# Patient Record
Sex: MALE | Race: WHITE | Employment: FULL TIME | URBAN - METROPOLITAN AREA
[De-identification: names, ages, dates, MRNs, and addresses within clinical notes are randomized per-mention and may not be internally consistent; named-entity substitution may affect disease eponyms.]

---

## 2017-01-16 ENCOUNTER — OFFICE VISIT (OUTPATIENT)
Dept: CARDIOLOGY CLINIC | Age: 67
End: 2017-01-16

## 2017-01-16 ENCOUNTER — DOCUMENTATION ONLY (OUTPATIENT)
Dept: CARDIOLOGY CLINIC | Age: 67
End: 2017-01-16

## 2017-01-16 VITALS
WEIGHT: 224 LBS | RESPIRATION RATE: 20 BRPM | OXYGEN SATURATION: 97 % | HEART RATE: 78 BPM | DIASTOLIC BLOOD PRESSURE: 86 MMHG | SYSTOLIC BLOOD PRESSURE: 148 MMHG | HEIGHT: 73 IN | BODY MASS INDEX: 29.69 KG/M2

## 2017-01-16 DIAGNOSIS — I95.1 ORTHOSTATIC HYPOTENSION: ICD-10-CM

## 2017-01-16 DIAGNOSIS — G47.33 OSA (OBSTRUCTIVE SLEEP APNEA): ICD-10-CM

## 2017-01-16 DIAGNOSIS — I10 ESSENTIAL HYPERTENSION, BENIGN: ICD-10-CM

## 2017-01-16 DIAGNOSIS — I10 ESSENTIAL HYPERTENSION, BENIGN: Primary | ICD-10-CM

## 2017-01-16 DIAGNOSIS — E78.5 DYSLIPIDEMIA: ICD-10-CM

## 2017-01-16 DIAGNOSIS — I87.2 VENOUS INSUFFICIENCY: ICD-10-CM

## 2017-01-16 RX ORDER — AMLODIPINE BESYLATE 5 MG/1
TABLET ORAL
Qty: 90 TAB | Refills: 3 | Status: SHIPPED | OUTPATIENT
Start: 2017-01-16 | End: 2017-12-25 | Stop reason: SDUPTHER

## 2017-01-16 RX ORDER — ATORVASTATIN CALCIUM 20 MG/1
20 TABLET, FILM COATED ORAL DAILY
Qty: 90 TAB | Refills: 3 | Status: SHIPPED | OUTPATIENT
Start: 2017-01-16 | End: 2018-03-22 | Stop reason: SDUPTHER

## 2017-01-16 RX ORDER — LABETALOL 100 MG/1
300 TABLET, FILM COATED ORAL 2 TIMES DAILY
Qty: 180 TAB | Refills: 3 | Status: SHIPPED | OUTPATIENT
Start: 2017-01-16 | End: 2017-08-04 | Stop reason: DRUGHIGH

## 2017-01-16 NOTE — PROGRESS NOTES
Visit Vitals    /86 (BP 1 Location: Right arm, BP Patient Position: Sitting)    Pulse 78    Resp 20    Ht 6' 1\" (1.854 m)    Wt 224 lb (101.6 kg)    SpO2 97%    BMI 29.55 kg/m2     Extended / Orthostatic Vitals:  Patient Position 2: Supine  BP 2: 152/84  Pulse 2: 68  Patient Position 3: Sitting  BP 3: 148/86  Pulse 3: 74  Patient Position 4: Standing  BP 4: 136/78  Pulse 4: 78

## 2017-01-16 NOTE — PROGRESS NOTES
Jose Dunn is a 62 yo man with hypertension who returns at this time for management of dizziness and syncope, as well as hypertension and dyslipidemia. Last saw me 6/15; Dr. Jesika Saeed 8/16. He has one daily cup of coffee; drinks less water since he's retired. Occasionally wears knee-high support hose. Occasional dizziness. Patient denies exertional chest pain, dyspnea, palpitations, edema, syncope or near-syncope, orthopnea or paroxysmal nocturnal dyspnea. Diagnosed with mild RYAN. Refuses CPAP and dental device. Taking 2000 international units Vitamin D3 twice weekly. Not clear why this is the case. Problem List  Date Reviewed: 1/16/2017          Codes Class Noted    Orthostatic hypotension ICD-10-CM: I95.1  ICD-9-CM: 458.0  12/29/2016        Dyslipidemia ICD-10-CM: E78.5  ICD-9-CM: 272.4  12/10/2014        RYAN (obstructive sleep apnea) ICD-10-CM: G47.33  ICD-9-CM: 327.23  6/11/2014        Vitamin D deficiency ICD-10-CM: E55.9  ICD-9-CM: 268.9  3/26/2014        Other and unspecified hyperlipidemia ICD-10-CM: E78.5  ICD-9-CM: 272.4  2/5/2014        Essential hypertension, benign ICD-10-CM: I10  ICD-9-CM: 401.1  6/27/2012        Venous insufficiency ICD-10-CM: I87.2  ICD-9-CM: 459.81  6/27/2012        Sinus bradycardia ICD-10-CM: R00.1  ICD-9-CM: 427.89  6/27/2012              Cardiac testing:  Echo 6/27/12- Normal, LVEF 60%  Echo 1/6/16- Normal, EF 60%      No Known Allergies  Current Outpatient Prescriptions on File Prior to Visit   Medication Sig Dispense Refill    amLODIPine (NORVASC) 5 mg tablet One pill by mouth at bedtime. 30 Tab 5    atorvastatin (LIPITOR) 20 mg tablet Take 1 Tab by mouth daily. 90 Tab 3    labetalol (NORMODYNE) 100 mg tablet Take 3 Tabs by mouth two (2) times a day. 180 Tab 3    cholecalciferol (VITAMIN D3) 1,000 unit tablet One pill by mouth daily. 90 Tab 3     No current facility-administered medications on file prior to visit.         Social History    Smoking status: Never Smoker     Smokeless tobacco: 1 cup coffee/day, 1 pepsi/ day    Alcohol Use: 0.5 oz/week     1 drink(s) per week     Past Medical History   Diagnosis Date    BPH (benign prostatic hyperplasia)     Dyslipidemia 12/10/2014    Essential hypertension     Hyperlipidemia     RYAN (obstructive sleep apnea) 6/11/2014    Syncope     Vitamin D deficiency 3/26/2014       Visit Vitals    /86 (BP 1 Location: Right arm, BP Patient Position: Sitting)    Pulse 78    Resp 20    Ht 6' 1\" (1.854 m)    Wt 224 lb (101.6 kg)    SpO2 97%    BMI 29.55 kg/m2   Recheck seated:  138/80    Extended / Orthostatic Vitals:  Patient Position 2: Supine  BP 2: 152/84  Pulse 2: 68  Patient Position 3: Sitting  BP 3: 148/86  Pulse 3: 74  Patient Position 4: Standing  BP 4: 136/78  Pulse 4: 78     Wt Readings from Last 3 Encounters:   01/16/17 224 lb (101.6 kg)   12/29/16 221 lb (100.2 kg)   08/03/16 214 lb (97.1 kg)     Neck:  Supple, with no JVD. No bruits or thyroid abnl. Chest:  Clear to auscultation. CV:  Heart rate is regular with nondisplaced PMI, 1-2/6 BRIAN murmur; no gallop. Abd:  No tenderness, soft. Benign. Ext:   No edema. Mild venous varicosities. Encounter Diagnoses   Name Primary?  Essential hypertension, benign Yes    Orthostatic hypotension     RYAN (obstructive sleep apnea)     Venous insufficiency        Mr. Overton blood pressure is improved with the addition of amlodipine. However, he is slightly orthostatic, but asymptomatic. Will continue with labetalol at 300 mg bid and the amlodipine, monitoring for orthostasis. Likely his untreated RYAN is contributing to his HTN. Continue with measures to eliminate orthostasis: copious water intake, the elimination of caffeine, a liberal sodium diet and support hose. Follow up as planned with Dr. Onelia Erazo in August. Repeat fasting lipid panel, CMP and Vitamin D prior to follow-up with me in 6 months.       Tabatha Burns, ANP

## 2017-08-04 ENCOUNTER — OFFICE VISIT (OUTPATIENT)
Dept: CARDIOLOGY CLINIC | Age: 67
End: 2017-08-04

## 2017-08-04 VITALS
SYSTOLIC BLOOD PRESSURE: 138 MMHG | HEIGHT: 73 IN | DIASTOLIC BLOOD PRESSURE: 80 MMHG | RESPIRATION RATE: 16 BRPM | OXYGEN SATURATION: 94 % | BODY MASS INDEX: 27.86 KG/M2 | WEIGHT: 210.2 LBS | HEART RATE: 60 BPM

## 2017-08-04 DIAGNOSIS — R00.1 SINUS BRADYCARDIA: Primary | ICD-10-CM

## 2017-08-04 DIAGNOSIS — I87.2 VENOUS INSUFFICIENCY: ICD-10-CM

## 2017-08-04 DIAGNOSIS — I95.1 ORTHOSTATIC HYPOTENSION: ICD-10-CM

## 2017-08-04 RX ORDER — LABETALOL 300 MG/1
300 TABLET, FILM COATED ORAL 2 TIMES DAILY
COMMUNITY
End: 2017-08-04 | Stop reason: SDUPTHER

## 2017-08-04 RX ORDER — LABETALOL 200 MG/1
200 TABLET, FILM COATED ORAL 2 TIMES DAILY
Qty: 60 TAB | Refills: 6 | Status: SHIPPED | OUTPATIENT
Start: 2017-08-04 | End: 2018-02-19 | Stop reason: DRUGHIGH

## 2017-08-04 NOTE — PROGRESS NOTES
Extended / Orthostatic Vitals:  Patient Position 2: Supine  BP 2: 138/80  Pulse 2: 60  Patient Position 3: Standing  BP 3: 114/64  Pulse 3: 68

## 2017-08-04 NOTE — PATIENT INSTRUCTIONS
· Reduce labetalol to 200mg twice daily. · Follow up in 6 months. · Contact our office with any concerns.

## 2017-08-04 NOTE — PROGRESS NOTES
HISTORY OF PRESENTING ILLNESS      Rosas Murillo is a 61 y.o. male with HTN, dyslipidemia, RYAN, venous insufficiency, bradycardia. Echocardiogram demonstrated preserved LV function without wall motion abnormalities and without valvular regurgitation or stenosis. We suspected his symptoms were related to deconditioning. He is active with yard work but does not exercise regularly. Vital signs today demonstrate orthostatic hypotension. He notes occasional lightheadedness upon standing from seated position, mainly at nighttime. He reports concentrated urine and has not been hydrating aggressively. The patient denies chest pain/ shortness of breath, orthopnea, PND, LE edema, palpitations, syncope,  or fatigue.         ACTIVE PROBLEM LIST     Patient Active Problem List    Diagnosis Date Noted    Orthostatic hypotension 12/29/2016    Dyslipidemia 12/10/2014    RYAN (obstructive sleep apnea) 06/11/2014    Vitamin D deficiency 03/26/2014    Other and unspecified hyperlipidemia 02/05/2014    Essential hypertension, benign 06/27/2012    Venous insufficiency 06/27/2012    Sinus bradycardia 06/27/2012           PAST MEDICAL HISTORY     Past Medical History:   Diagnosis Date    BPH (benign prostatic hyperplasia)     Dyslipidemia 12/10/2014    Essential hypertension     Hyperlipidemia     RYAN (obstructive sleep apnea) 6/11/2014    Syncope     Vitamin D deficiency 3/26/2014           PAST SURGICAL HISTORY     Past Surgical History:   Procedure Laterality Date    HX HEENT      tonsillectomy          ALLERGIES     No Known Allergies       FAMILY HISTORY     Family History   Problem Relation Age of Onset    Cancer Father     negative for cardiac disease       SOCIAL HISTORY     Social History     Social History    Marital status:      Spouse name: N/A    Number of children: N/A    Years of education: N/A     Social History Main Topics    Smoking status: Never Smoker    Smokeless tobacco: Never Used    Alcohol use 0.6 oz/week     1 Standard drinks or equivalent per week      Comment: 1/2 beer weekly     Drug use: No    Sexual activity: Not Currently     Other Topics Concern    None     Social History Narrative         MEDICATIONS     Current Outpatient Prescriptions   Medication Sig    labetalol (NORMODYNE) 300 mg tablet Take 300 mg by mouth two (2) times a day.  atorvastatin (LIPITOR) 20 mg tablet Take 1 Tab by mouth daily.  amLODIPine (NORVASC) 5 mg tablet One pill by mouth at bedtime.  cholecalciferol (VITAMIN D3) 1,000 unit tablet One pill by mouth daily. No current facility-administered medications for this visit. I have reviewed the nurses notes, vitals, problem list, allergy list, medical history, family, social history and medications. REVIEW OF SYMPTOMS      General: Pt denies excessive weight gain or loss. Pt is able to conduct ADL's  HEENT: Denies blurred vision, headaches, hearing loss, epistaxis and difficulty swallowing. Respiratory: Denies cough, congestion, shortness of breath, RENEE, wheezing or stridor. Cardiovascular: Denies precordial pain, palpitations, edema or PND  Gastrointestinal: Denies poor appetite, indigestion, abdominal pain or blood in stool  Genitourinary: Denies hematuria, dysuria, increased urinary frequency  Musculoskeletal: Denies joint pain or swelling from muscles or joints  Neurologic: +occasional dizziness, Denies tremor, paresthesias, headache, or sensory motor disturbance  Psychiatric: Denies confusion, insomnia, depression  Integumentray: Denies rash, itching or ulcers. Hematologic: Denies easy bruising, bleeding       PHYSICAL EXAMINATION      Vitals:    08/04/17 0849   Resp: 16   Weight: 210 lb 3.2 oz (95.3 kg)   Height: 6' 1\" (1.854 m)     General: Well developed, in no acute distress.   HEENT: No jaundice, oral mucosa moist, no oral ulcers  Neck: Supple, no stiffness, no lymphadenopathy, supple  Heart:  Normal S1/S2 negative S3 or S4. Regular, no murmur, gallop or rub, no jugular venous distention  Respiratory: Clear bilaterally x 4, no wheezing or rales  Abdomen:   Soft, non-tender, bowel sounds are active.   Extremities:  No edema, normal cap refill, no cyanosis. Musculoskeletal: No clubbing, no deformities  Neuro: A&Ox3, speech clear, gait stable, cooperative, no focal neurologic deficits  Skin: Skin color is normal. No rashes or lesions. Non diaphoretic, moist.  Vascular: 2+ pulses symmetric in all extremities       DIAGNOSTIC DATA      EKG: sinus bradycardia        LABORATORY DATA      Lab Results   Component Value Date/Time    WBC 6.3 12/16/2015 02:03 PM    HGB 15.7 12/16/2015 02:03 PM    HCT 45.7 12/16/2015 02:03 PM    PLATELET 541 35/99/7188 02:03 PM    MCV 90 12/16/2015 02:03 PM      Lab Results   Component Value Date/Time    Sodium 140 01/09/2017 12:58 PM    Potassium 4.7 01/09/2017 12:58 PM    Chloride 100 01/09/2017 12:58 PM    CO2 26 01/09/2017 12:58 PM    Glucose 91 01/09/2017 12:58 PM    BUN 22 01/09/2017 12:58 PM    Creatinine 1.12 01/09/2017 12:58 PM    BUN/Creatinine ratio 20 01/09/2017 12:58 PM    GFR est AA 80 01/09/2017 12:58 PM    GFR est non-AA 69 01/09/2017 12:58 PM    Calcium 9.2 01/09/2017 12:58 PM    Bilirubin, total 1.1 01/09/2017 12:58 PM    AST (SGOT) 19 01/09/2017 12:58 PM    Alk. phosphatase 70 01/09/2017 12:58 PM    Protein, total 6.5 01/09/2017 12:58 PM    Albumin 4.5 01/09/2017 12:58 PM    A-G Ratio 2.3 01/09/2017 12:58 PM    ALT (SGPT) 38 01/09/2017 12:58 PM           ASSESSMENT      1. Hypertension  2. Dyslipidemia  3. Venous insufficiency  4. Orthostatic hypotension      PLAN     Will plan to decrease Labetalol to 150mg BID. Continue to monitor blood pressures and monitor clinically. FOLLOW-UP   1 month    Thank you, Danny Salas MD for allowing me to participate in the care of this extraordinarily pleasant male.  Please do not hesitate to contact me for further questions/concerns.      VEGA Clark MD  Cardiac Electrophysiology / Cardiology    [facility]  [address] [facility], [facility], [address]  [place]     Celso Denson  [phone] / [phone] Fax   [phone] / [phone] Fax

## 2017-12-25 DIAGNOSIS — I95.1 ORTHOSTATIC HYPOTENSION: ICD-10-CM

## 2017-12-25 DIAGNOSIS — R00.1 SINUS BRADYCARDIA: ICD-10-CM

## 2017-12-25 DIAGNOSIS — I10 ESSENTIAL HYPERTENSION, BENIGN: ICD-10-CM

## 2017-12-25 DIAGNOSIS — I87.2 VENOUS INSUFFICIENCY: ICD-10-CM

## 2018-01-16 RX ORDER — AMLODIPINE BESYLATE 5 MG/1
TABLET ORAL
Qty: 90 TAB | Refills: 3 | Status: SHIPPED | OUTPATIENT
Start: 2018-01-16 | End: 2018-01-16 | Stop reason: SDUPTHER

## 2018-01-16 RX ORDER — AMLODIPINE BESYLATE 5 MG/1
5 TABLET ORAL
Qty: 90 TAB | Refills: 2 | Status: SHIPPED | OUTPATIENT
Start: 2018-01-16 | End: 2018-10-13 | Stop reason: SDUPTHER

## 2018-01-16 NOTE — TELEPHONE ENCOUNTER
Requested Prescriptions     Signed Prescriptions Disp Refills    amLODIPine (NORVASC) 5 mg tablet 90 Tab 2     Sig: Take 1 Tab by mouth nightly.      Authorizing Provider: Denilson Mackey     Ordering User: Yohan Garcia

## 2018-01-16 NOTE — TELEPHONE ENCOUNTER
Pharmacy verified. Requested Prescriptions     Pending Prescriptions Disp Refills    amLODIPine (NORVASC) 5 mg tablet [Pharmacy Med Name: AMLODIPINE BESYLATE TABS 5MG] 90 Tab 3     Sig: TAKE 1 TABLET AT BEDTIME     Thanks!   Rosas Henderson

## 2018-02-19 ENCOUNTER — OFFICE VISIT (OUTPATIENT)
Dept: CARDIOLOGY CLINIC | Age: 68
End: 2018-02-19

## 2018-02-19 VITALS
BODY MASS INDEX: 28.71 KG/M2 | DIASTOLIC BLOOD PRESSURE: 72 MMHG | SYSTOLIC BLOOD PRESSURE: 128 MMHG | WEIGHT: 216.6 LBS | HEIGHT: 73 IN | HEART RATE: 68 BPM | OXYGEN SATURATION: 94 % | RESPIRATION RATE: 16 BRPM

## 2018-02-19 DIAGNOSIS — I87.2 VENOUS INSUFFICIENCY: ICD-10-CM

## 2018-02-19 DIAGNOSIS — E78.5 DYSLIPIDEMIA: ICD-10-CM

## 2018-02-19 DIAGNOSIS — G47.33 OSA (OBSTRUCTIVE SLEEP APNEA): ICD-10-CM

## 2018-02-19 DIAGNOSIS — I95.1 ORTHOSTATIC HYPOTENSION: Primary | ICD-10-CM

## 2018-02-19 RX ORDER — LABETALOL 100 MG/1
300 TABLET, FILM COATED ORAL 2 TIMES DAILY
COMMUNITY
Start: 2017-12-12 | End: 2018-02-19 | Stop reason: SDUPTHER

## 2018-02-19 RX ORDER — LABETALOL 100 MG/1
200 TABLET, FILM COATED ORAL 2 TIMES DAILY
Qty: 60 TAB | Refills: 6 | Status: SHIPPED | OUTPATIENT
Start: 2018-02-19 | End: 2018-08-09 | Stop reason: SDUPTHER

## 2018-02-19 NOTE — PROGRESS NOTES
Extended / Orthostatic Vitals:  Patient Position 2: Supine  BP 2: 128/72  Pulse 2: 68  Patient Position 3: Standing  BP 3: 112/62  Pulse 3: 84

## 2018-02-19 NOTE — PROGRESS NOTES
HISTORY OF PRESENTING ILLNESS      Hector Gupta is a 74 y.o. male with hypertension, dyslipidemia, RYAN, venous insufficiency, bradycardia. Echocardiogram demonstrated preserved LV function without wall motion abnormalities and without valvular regurgitation or stenosis. We suspected his symptoms were related to deconditioning. Last visit, Vital signs demonstrated orthostatic hypotension. He notes occasional lightheadedness upon standing from seated position, mainly at nighttime. Last visit we discussed trialing a lowered labetalol dose to 150 mg bid; however, he decided to continue 300mg BID dosing. He reports occasional tingling in his lower extremities, swelling seems to be controlled with use of compression stockings.       ACTIVE PROBLEM LIST     Patient Active Problem List    Diagnosis Date Noted    Orthostatic hypotension 12/29/2016    Dyslipidemia 12/10/2014    RYAN (obstructive sleep apnea) 06/11/2014    Vitamin D deficiency 03/26/2014    Other and unspecified hyperlipidemia 02/05/2014    Essential hypertension, benign 06/27/2012    Venous insufficiency 06/27/2012    Sinus bradycardia 06/27/2012           PAST MEDICAL HISTORY     Past Medical History:   Diagnosis Date    BPH (benign prostatic hyperplasia)     Dyslipidemia 12/10/2014    Essential hypertension     Hyperlipidemia     RYAN (obstructive sleep apnea) 6/11/2014    Syncope     Vitamin D deficiency 3/26/2014           PAST SURGICAL HISTORY     Past Surgical History:   Procedure Laterality Date    HX HEENT      tonsillectomy          ALLERGIES     No Known Allergies       FAMILY HISTORY     Family History   Problem Relation Age of Onset    Cancer Father     negative for cardiac disease       SOCIAL HISTORY     Social History     Social History    Marital status:      Spouse name: N/A    Number of children: N/A    Years of education: N/A     Social History Main Topics    Smoking status: Never Smoker    Smokeless tobacco: Never Used    Alcohol use 0.6 oz/week     1 Standard drinks or equivalent per week      Comment: 1/2 beer weekly     Drug use: No    Sexual activity: Not Currently     Other Topics Concern    Not on file     Social History Narrative         MEDICATIONS     Current Outpatient Prescriptions   Medication Sig    amLODIPine (NORVASC) 5 mg tablet Take 1 Tab by mouth nightly.  labetalol (NORMODYNE) 200 mg tablet Take 1 Tab by mouth two (2) times a day.  atorvastatin (LIPITOR) 20 mg tablet Take 1 Tab by mouth daily.  cholecalciferol (VITAMIN D3) 1,000 unit tablet One pill by mouth daily. No current facility-administered medications for this visit. I have reviewed the nurses notes, vitals, problem list, allergy list, medical history, family, social history and medications. REVIEW OF SYMPTOMS      General: Pt denies excessive weight gain or loss. Pt is able to conduct ADL's  HEENT: Denies blurred vision, headaches, hearing loss, epistaxis and difficulty swallowing. Respiratory: Denies cough, congestion, shortness of breath, RENEE, wheezing or stridor. Cardiovascular: Denies precordial pain, palpitations, edema or PND  Gastrointestinal: Denies poor appetite, indigestion, abdominal pain or blood in stool  Genitourinary: Denies hematuria, dysuria, increased urinary frequency  Musculoskeletal: Denies joint pain or swelling from muscles or joints  Neurologic: +BLE intermittent paresthesias, Denies tremor, headache, or sensory motor disturbance  Psychiatric: Denies confusion, insomnia, depression  Integumentray: Denies rash, itching or ulcers. Hematologic: Denies easy bruising, bleeding       PHYSICAL EXAMINATION      There were no vitals filed for this visit. General: Well developed, in no acute distress. HEENT: No jaundice, oral mucosa moist, no oral ulcers  Neck: Supple, no stiffness, no lymphadenopathy, supple  Heart:  Normal S1/S2 negative S3 or S4.  Regular, no murmur, gallop or rub, no jugular venous distention  Respiratory: Clear bilaterally x 4, no wheezing or rales  Abdomen:   Soft, non-tender, bowel sounds are active.   Extremities:  No edema, normal cap refill, no cyanosis. Musculoskeletal: No clubbing, no deformities  Neuro: A&Ox3, speech clear, gait stable, cooperative, no focal neurologic deficits  Skin: Skin color is normal. No rashes or lesions. Non diaphoretic, moist.  Vascular: 2+ pulses symmetric in all extremities       DIAGNOSTIC DATA      EKG: normal sinus rhythm        LABORATORY DATA      Lab Results   Component Value Date/Time    WBC 6.3 12/16/2015 02:03 PM    HGB 15.7 12/16/2015 02:03 PM    HCT 45.7 12/16/2015 02:03 PM    PLATELET 908 05/88/3306 02:03 PM    MCV 90 12/16/2015 02:03 PM      Lab Results   Component Value Date/Time    Sodium 140 01/09/2017 12:58 PM    Potassium 4.7 01/09/2017 12:58 PM    Chloride 100 01/09/2017 12:58 PM    CO2 26 01/09/2017 12:58 PM    Glucose 91 01/09/2017 12:58 PM    BUN 22 01/09/2017 12:58 PM    Creatinine 1.12 01/09/2017 12:58 PM    BUN/Creatinine ratio 20 01/09/2017 12:58 PM    GFR est AA 80 01/09/2017 12:58 PM    GFR est non-AA 69 01/09/2017 12:58 PM    Calcium 9.2 01/09/2017 12:58 PM    Bilirubin, total 1.1 01/09/2017 12:58 PM    AST (SGOT) 19 01/09/2017 12:58 PM    Alk. phosphatase 70 01/09/2017 12:58 PM    Protein, total 6.5 01/09/2017 12:58 PM    Albumin 4.5 01/09/2017 12:58 PM    A-G Ratio 2.3 01/09/2017 12:58 PM    ALT (SGPT) 38 01/09/2017 12:58 PM           ASSESSMENT      1. Hypertension  2. Dyslipidemia  3. Venous insufficiency  4. Orthostatic hypotension        PLAN     He would like to trial deescalating medical therapy. Will decrease labetalol to 200mg BID. Offered referral for venous insufficiency, however he declined at this time. Recommended continued use of compression stockings and increased hydration.       FOLLOW-UP   6 months    Thank you, Flavia Logan MD for allowing me to participate in the care of this extraordinarily pleasant male. Please do not hesitate to contact me for further questions/concerns.        Yong Blade, NP      [facility].  [address] [facility], [facility], [address]  [phone] / [phone] Fax   [phone] / [phone] Fax

## 2018-02-19 NOTE — PATIENT INSTRUCTIONS
Treatment Plan:  · Follow up in 6 months. · Decrease labetalol to 200mg twice daily. Contact our office with any concerns. Low HDL Cholesterol: After Your Visit  Your Care Instructions  Cholesterol is a type of fat in your blood. It is needed for many body functions, such as making new cells. Cholesterol is made by your body and also comes from food you eat. HDL (high-density lipoprotein) is the \"good\" cholesterol. Low levels of HDL can increase your risk of having a heart attack or stroke. It is best if your HDL level is at least 40 (measured in milligrams per deciliter, or mg/dL). Low HDL usually is caused by a poor diet and a lack of exercise. You may raise your HDL level by eating less animal fat and more vegetables. Getting regular exercise can also help. But for some people, low HDL runs in the family. If changes in diet and exercise do not raise your HDL level, your doctor may recommend medicine. Follow-up care is a key part of your treatment and safety. Be sure to make and go to all appointments, and call your doctor if you are having problems. Its also a good idea to know your test results and keep a list of the medicines you take. How can you care for yourself at home? Eat a healthy diet. Read food labels and try to avoid saturated fat and trans fat. Limit the amount of fatty meat and milk products you eat. Choose low-fat meats, such as skinless chicken breasts, and low-fat or nonfat dairy products. Bake, broil, grill, or steam foods instead of frying them. Avoid fried foods. Eat foods with whole grains, such as whole wheat bread, instead of white bread. Eat brown rice instead of white rice. Try not to eat liver and eggs. They contain a lot of \"bad\" cholesterol. Cook with oils such as olive, canola, or peanut oil. Eat beans and peas for protein. Try to lose weight if you are overweight. Get regular exercise. Even mild regular exercise alone may increase your HDL level.  Walking is a good choice. Bit by bit, increase the amount you walk every day. Try for at least 30 minutes on most days of the week. You also may want to swim, bike, or do other activities. Stop smoking, which can lower your HDL level. If you need help quitting, talk to your doctor about stop-smoking programs and medicines. These can increase your chances of quitting for good. Take your medicines exactly as prescribed. Call your doctor if you think you are having a problem with your medicine. When should you call for help? Call 911 anytime you think you may need emergency care. For example, call if:  You have signs of a stroke. These may include:  Sudden numbness, paralysis, or weakness in your face, arm, or leg, especially on only one side of your body. New problems with walking or balance. Sudden vision changes. Drooling or slurred speech. New problems speaking or understanding simple statements, or feeling confused. A sudden, severe headache that is different from past headaches. You have symptoms of a heart attack. These may include:  Chest pain or pressure, or a strange feeling in the chest.  Sweating. Shortness of breath. Nausea or vomiting. Pain, pressure, or a strange feeling in the back, neck, jaw, or upper belly or in one or both shoulders or arms. Lightheadedness or sudden weakness. A fast or irregular heartbeat. After you call 911, the  may tell you to chew 1 adult-strength or 2 to 4 low-dose aspirin. Wait for an ambulance. Do not try to drive yourself. Watch closely for changes in your health, and be sure to contact your doctor if:  Your HDL level does not increase after making diet and exercise changes. You are worried about your cholesterol level. You do not get better as expected. Where can you learn more? Go to [employer]  Enter L121 in the search box to learn more about \"Low HDL Cholesterol: After Your Visit. \"   © 1033-1196 [employer], [employer].  Care instructions adapted under license by [facility] (which disclaims liability or warranty for this information). This care instruction is for use with your licensed healthcare professional. If you have questions about a medical condition or this instruction, always ask your healthcare professional. [address] any warranty or liability for your use of this information.   Content Version: 2.7.875874; Last Revised: October 13, 2011

## 2018-03-22 DIAGNOSIS — E78.5 DYSLIPIDEMIA: Primary | ICD-10-CM

## 2018-03-22 DIAGNOSIS — E78.5 DYSLIPIDEMIA: ICD-10-CM

## 2018-03-22 RX ORDER — ATORVASTATIN CALCIUM 20 MG/1
20 TABLET, FILM COATED ORAL DAILY
Qty: 90 TAB | Refills: 3 | Status: SHIPPED | OUTPATIENT
Start: 2018-03-22 | End: 2019-03-18 | Stop reason: SDUPTHER

## 2018-03-22 NOTE — TELEPHONE ENCOUNTER
Pt [facility]. Pharmacy verified. Requested Prescriptions     Pending Prescriptions Disp Refills    atorvastatin (LIPITOR) 20 mg tablet 90 Tab 3     Sig: Take 1 Tab by mouth daily. Thanks!   Elinor Holt

## 2018-04-04 LAB
ALBUMIN SERPL-MCNC: 4.6 G/DL (ref 3.6–4.8)
ALBUMIN/GLOB SERPL: 2.4 {RATIO} (ref 1.2–2.2)
ALP SERPL-CCNC: 75 IU/L (ref 39–117)
ALT SERPL-CCNC: 26 IU/L (ref 0–44)
AST SERPL-CCNC: 20 IU/L (ref 0–40)
BILIRUB SERPL-MCNC: 0.9 MG/DL (ref 0–1.2)
BUN SERPL-MCNC: 19 MG/DL (ref 8–27)
BUN/CREAT SERPL: 18 (ref 10–24)
CALCIUM SERPL-MCNC: 9.6 MG/DL (ref 8.6–10.2)
CHLORIDE SERPL-SCNC: 100 MMOL/L (ref 96–106)
CHOLEST SERPL-MCNC: 139 MG/DL (ref 100–199)
CO2 SERPL-SCNC: 23 MMOL/L (ref 18–29)
CREAT SERPL-MCNC: 1.05 MG/DL (ref 0.76–1.27)
GFR SERPLBLD CREATININE-BSD FMLA CKD-EPI: 74 ML/MIN/1.73
GFR SERPLBLD CREATININE-BSD FMLA CKD-EPI: 86 ML/MIN/1.73
GLOBULIN SER CALC-MCNC: 1.9 G/DL (ref 1.5–4.5)
GLUCOSE SERPL-MCNC: 76 MG/DL (ref 65–99)
HDLC SERPL-MCNC: 37 MG/DL
INTERPRETATION, 910389: NORMAL
LDLC SERPL CALC-MCNC: 78 MG/DL (ref 0–99)
POTASSIUM SERPL-SCNC: 4.7 MMOL/L (ref 3.5–5.2)
PROT SERPL-MCNC: 6.5 G/DL (ref 6–8.5)
SODIUM SERPL-SCNC: 140 MMOL/L (ref 134–144)
TRIGL SERPL-MCNC: 118 MG/DL (ref 0–149)
VLDLC SERPL CALC-MCNC: 24 MG/DL (ref 5–40)

## 2018-08-09 RX ORDER — LABETALOL 200 MG/1
200 TABLET, FILM COATED ORAL 2 TIMES DAILY
Qty: 180 TAB | Refills: 2 | Status: SHIPPED | OUTPATIENT
Start: 2018-08-09 | End: 2019-04-18 | Stop reason: SDUPTHER

## 2018-08-09 NOTE — TELEPHONE ENCOUNTER
Patient would like a 90 day supply.    Requesting 200mg tablets so he can only take on a day   Pharmacy confirmed   Phone: [phone]

## 2018-08-09 NOTE — TELEPHONE ENCOUNTER
Requested Prescriptions     Signed Prescriptions Disp Refills    labetalol (NORMODYNE) 200 mg tablet 180 Tab 2     Sig: Take 1 Tab by mouth two (2) times a day. Authorizing Provider: Jaynie Schaumann     Ordering User: Jimmie Ambrose     Per verbal order Dr. Jose Rodriguez.

## 2018-08-27 ENCOUNTER — OFFICE VISIT (OUTPATIENT)
Dept: CARDIOLOGY CLINIC | Age: 68
End: 2018-08-27

## 2018-08-27 VITALS
BODY MASS INDEX: 26.64 KG/M2 | RESPIRATION RATE: 16 BRPM | WEIGHT: 201 LBS | DIASTOLIC BLOOD PRESSURE: 76 MMHG | HEIGHT: 73 IN | OXYGEN SATURATION: 94 % | SYSTOLIC BLOOD PRESSURE: 138 MMHG | HEART RATE: 67 BPM

## 2018-08-27 DIAGNOSIS — I87.2 VENOUS INSUFFICIENCY: Primary | ICD-10-CM

## 2018-08-27 NOTE — MR AVS SNAPSHOT
16[address] [facility] 600 1007 [facility] 
[phone] Patient: Monroe Chamberlain MRN: [id number] TKF:[id number]/[id number] Visit Information Date & Time Provider Department Dept. Phone Encounter #  
 8/27/2018 10:20 AM Thomas Waldron MD [facility] [place] [phone] [id number] Your Appointments 8/28/2019 10:20 AM  
ESTABLISHED PATIENT with Thomas Waldron MD  
[facility] (Amanda Navarro) Appt Note: annual  
 354 [facility] 600 1007 [facility]  
[address] [facility] [place] [address] [address] Upcoming Health Maintenance Date Due Hepatitis C Screening 1950 DTaP/Tdap/Td series (1 - Tdap) 9/1/1973 FOBT Q 1 YEAR AGE 50-75 9/1/2002 ZOSTER VACCINE AGE 60> 7/1/2012 GLAUCOMA SCREENING Q2Y 9/1/2017 Pneumococcal 65+ Low/Medium Risk (1 of 2 - PCV13) 9/1/2017 MEDICARE YEARLY EXAM 3/14/2018 Influenza Age 5 to Adult 8/1/2018 Allergies as of 8/27/2018  Review Complete On: 8/27/2018 By: Jaja Urias LPN No Known Allergies Current Immunizations  Never Reviewed No immunizations on file. Not reviewed this visit You Were Diagnosed With   
  
 Codes Comments Venous insufficiency    -  Primary ICD-10-CM: B20.2 ICD-9-CM: 459.81 Vitals BP Pulse Resp Height(growth percentile) Weight(growth percentile) SpO2  
 138/76 67 16 6' 1\" (1.854 m) 201 lb (91.2 kg) 94% BMI Smoking Status 26.52 kg/m2 Never Smoker Vitals History BMI and BSA Data Body Mass Index Body Surface Area  
 26.52 kg/m 2 2.17 m 2 Preferred Pharmacy Pharmacy Name Phone [facility], [facility] [phone] Your Updated Medication List  
  
   
This list is accurate as of 8/27/18 11:00 AM.  Always use your most recent med list. amLODIPine 5 mg tablet Commonly known as:  Lashay Croon Take 1 Tab by mouth nightly. atorvastatin 20 mg tablet Commonly known as:  LIPITOR Take 1 Tab by mouth daily. cholecalciferol 1,000 unit tablet Commonly known as:  VITAMIN D3 One pill by mouth daily. labetalol 200 mg tablet Commonly known as:  Marlane Angelucci Take 1 Tab by mouth two (2) times a day. We Performed the Following AMB POC EKG ROUTINE W/ 12 LEADS, INTER & REP [36055 CPT(R)] Introducing [facility]! [employer] introduces Branded Online patient portal. Now you can access parts of your medical record, email your doctor's office, and request medication refills online. 1. In your internet browser, go to [email or web address] 2. Click on the First Time User? Click Here link in the Sign In box. You will see the New Member Sign Up page. 3. Enter your Branded Online Access Code exactly as it appears below. You will not need to use this code after youve completed the sign-up process. If you do not sign up before the expiration date, you must request a new code. · Branded Online Access Code: [email or web address] Expires: 11/25/2018 11:00 AM 
 
4. Enter the last four digits of your Social Security Number (xxxx) and Date of Birth (mm/dd/yyyy) as indicated and click Submit. You will be taken to the next sign-up page. 5. Create a Branded Online ID. This will be your Branded Online login ID and cannot be changed, so think of one that is secure and easy to remember. 6. Create a Branded Online password. You can change your password at any time. 7. Enter your Password Reset Question and Answer. This can be used at a later time if you forget your password. 8. Enter your e-mail address. You will receive e-mail notification when new information is available in [address] 9. Click Sign Up. You can now view and download portions of your medical record. 10. Click the Download Summary menu link to download a portable copy of your medical information. If you have questions, please visit the Frequently Asked Questions section of the [employer] website. Remember, [employer] is NOT to be used for urgent needs. For medical emergencies, dial 911. Now available from your iPhone and Android! Please provide this summary of care documentation to your next provider. Your primary care clinician is listed as Brianne Mo. If you have any questions after today's visit, please call [phone].

## 2018-08-27 NOTE — PROGRESS NOTES
Chief Complaint   Patient presents with    Hypotension     orthostatic hypotension    Other     6 mo fu     1. Have you been to the ER, urgent care clinic since your last visit? Hospitalized since your last visit? No    2. Have you seen or consulted any other health care providers outside of the [address] [place] [address] since your last visit? Include any pap smears or colon screening.  Yes When: March 2018 Where: [facility] Dr Kush Choudhary Reason for visit: prostate     Vitals:    08/27/18 1019   BP: 138/76   Pulse: 67   Resp: 16   SpO2: 94%   Weight: 201 lb (91.2 kg)   Height: 6' 1\" (1.854 m)

## 2018-08-27 NOTE — PROGRESS NOTES
HISTORY OF PRESENTING ILLNESS      Babak Martinez is a 74 y.o. male with hypertension, dyslipidemia, RYAN, venous insufficiency, bradycardia.  Echocardiogram demonstrated preserved LV function without wall motion abnormalities and without valvular regurgitation or stenosis. We suspected his symptoms were related to deconditioning. Previously, vital signs demonstrated orthostatic hypotension.  He noted occasional lightheadedness upon standing from seated position, mainly at nighttime. Last visit we decreased labetalol to 200 mg twice daily and offered a referral for venous insufficiency evaluation however he declined. On vital examination today, he has a 38 mmHg drop in systolic blood pressure however his heart rate remained stable. He has occasional orthostatic symptoms however overall is pleased with his quality of life at this time.        ACTIVE PROBLEM LIST     Patient Active Problem List    Diagnosis Date Noted    Orthostatic hypotension 12/29/2016    Dyslipidemia 12/10/2014    RYAN (obstructive sleep apnea) 06/11/2014    Vitamin D deficiency 03/26/2014    Other and unspecified hyperlipidemia 02/05/2014    Essential hypertension, benign 06/27/2012    Venous insufficiency 06/27/2012    Sinus bradycardia 06/27/2012           PAST MEDICAL HISTORY     Past Medical History:   Diagnosis Date    BPH (benign prostatic hyperplasia)     Dyslipidemia 12/10/2014    Essential hypertension     Hyperlipidemia     RYAN (obstructive sleep apnea) 6/11/2014    Syncope     Vitamin D deficiency 3/26/2014           PAST SURGICAL HISTORY     Past Surgical History:   Procedure Laterality Date    HX HEENT      tonsillectomy          ALLERGIES     No Known Allergies       FAMILY HISTORY     Family History   Problem Relation Age of Onset    Cancer Father     negative for cardiac disease       SOCIAL HISTORY     Social History     Social History    Marital status:      Spouse name: N/A    Number of children: N/A    Years of education: N/A     Social History Main Topics    Smoking status: Never Smoker    Smokeless tobacco: Never Used    Alcohol use 0.6 oz/week     1 Standard drinks or equivalent per week      Comment: 1/2 beer weekly     Drug use: No    Sexual activity: Not Currently     Other Topics Concern    Not on file     Social History Narrative         MEDICATIONS     Current Outpatient Prescriptions   Medication Sig    labetalol (NORMODYNE) 200 mg tablet Take 1 Tab by mouth two (2) times a day.  atorvastatin (LIPITOR) 20 mg tablet Take 1 Tab by mouth daily.  amLODIPine (NORVASC) 5 mg tablet Take 1 Tab by mouth nightly.  cholecalciferol (VITAMIN D3) 1,000 unit tablet One pill by mouth daily. (Patient taking differently: 2,000 Units. One pill by mouth daily.)     No current facility-administered medications for this visit. I have reviewed the nurses notes, vitals, problem list, allergy list, medical history, family, social history and medications. REVIEW OF SYMPTOMS      General: Pt denies excessive weight gain or loss. Pt is able to conduct ADL's  HEENT: Denies blurred vision, headaches, hearing loss, epistaxis and difficulty swallowing. Respiratory: Denies cough, congestion, shortness of breath, RENEE, wheezing or stridor. Cardiovascular: Denies precordial pain, palpitations, edema or PND  Gastrointestinal: Denies poor appetite, indigestion, abdominal pain or blood in stool  Genitourinary: Denies hematuria, dysuria, increased urinary frequency  Musculoskeletal: Denies joint pain or swelling from muscles or joints  Neurologic: Denies tremor, paresthesias, headache, or sensory motor disturbance  Psychiatric: Denies confusion, insomnia, depression  Integumentray: Denies rash, itching or ulcers. Hematologic: Denies easy bruising, bleeding       PHYSICAL EXAMINATION      There were no vitals filed for this visit. General: Well developed, in no acute distress.   HEENT: No jaundice, oral mucosa moist, no oral ulcers  Neck: Supple, no stiffness, no lymphadenopathy, supple  Heart:  Normal S1/S2 negative S3 or S4. Regular, no murmur, gallop or rub, no jugular venous distention  Respiratory: Clear bilaterally x 4, no wheezing or rales  Abdomen:   Soft, non-tender, bowel sounds are active.   Extremities:  No edema, normal cap refill, no cyanosis. Musculoskeletal: No clubbing, no deformities  Neuro: A&Ox3, speech clear, gait stable, cooperative, no focal neurologic deficits  Skin: Skin color is normal. No rashes or lesions. Non diaphoretic, moist.  Vascular: 2+ pulses symmetric in all extremities       DIAGNOSTIC DATA      EKG:        LABORATORY DATA      Lab Results   Component Value Date/Time    WBC 6.3 12/16/2015 02:03 PM    HGB 15.7 12/16/2015 02:03 PM    HCT 45.7 12/16/2015 02:03 PM    PLATELET 261 05/77/0698 02:03 PM    MCV 90 12/16/2015 02:03 PM      Lab Results   Component Value Date/Time    Sodium 140 04/03/2018 02:43 PM    Potassium 4.7 04/03/2018 02:43 PM    Chloride 100 04/03/2018 02:43 PM    CO2 23 04/03/2018 02:43 PM    Glucose 76 04/03/2018 02:43 PM    BUN 19 04/03/2018 02:43 PM    Creatinine 1.05 04/03/2018 02:43 PM    BUN/Creatinine ratio 18 04/03/2018 02:43 PM    GFR est AA 86 04/03/2018 02:43 PM    GFR est non-AA 74 04/03/2018 02:43 PM    Calcium 9.6 04/03/2018 02:43 PM    Bilirubin, total 0.9 04/03/2018 02:43 PM    AST (SGOT) 20 04/03/2018 02:43 PM    Alk. phosphatase 75 04/03/2018 02:43 PM    Protein, total 6.5 04/03/2018 02:43 PM    Albumin 4.6 04/03/2018 02:43 PM    A-G Ratio 2.4 (H) 04/03/2018 02:43 PM    ALT (SGPT) 26 04/03/2018 02:43 PM           ASSESSMENT      1. Hypertension  2. Dyslipidemia  3. Venous insufficiency  4. Orthostatic hypotension        PLAN     Discussed potentially lowering labetalol dose further. We will continue current dosing at this time however and patient will hydrate and liberalize sodium intake.        FOLLOW-UP     1 year      Thank you, Balta Cornejo MD for allowing me to participate in the care of this extraordinarily pleasant male. Please do not hesitate to contact me for further questions/concerns.          Kevin Camargo MD  Cardiac Electrophysiology / Cardiology    [facility] 92.  [address] [facility], [facility], [address] [place] [address] [address]  [phone] / [phone] Fax   [phone] / [phone] Fax

## 2018-10-13 DIAGNOSIS — I10 ESSENTIAL HYPERTENSION, BENIGN: ICD-10-CM

## 2018-10-15 RX ORDER — AMLODIPINE BESYLATE 5 MG/1
TABLET ORAL
Qty: 90 TAB | Refills: 2 | Status: SHIPPED | OUTPATIENT
Start: 2018-10-15 | End: 2019-07-10 | Stop reason: SDUPTHER

## 2018-10-15 NOTE — TELEPHONE ENCOUNTER
Requested Prescriptions     Signed Prescriptions Disp Refills    amLODIPine (NORVASC) 5 mg tablet 90 Tab 2     Sig: Take 1 tablet nightly     Authorizing Provider: Nevaeh Lange     Ordering User: Diana Alvarez     Refill per verbal order Dr. Britany Woo.

## 2019-03-18 DIAGNOSIS — E78.5 DYSLIPIDEMIA: ICD-10-CM

## 2019-03-18 RX ORDER — ATORVASTATIN CALCIUM 20 MG/1
20 TABLET, FILM COATED ORAL DAILY
Qty: 90 TAB | Refills: 1 | Status: SHIPPED | OUTPATIENT
Start: 2019-03-18 | End: 2019-09-17 | Stop reason: SDUPTHER

## 2019-03-18 NOTE — TELEPHONE ENCOUNTER
Requested Prescriptions     Signed Prescriptions Disp Refills    atorvastatin (LIPITOR) 20 mg tablet 90 Tab 1     Sig: Take 1 Tab by mouth daily. Authorizing Provider: An Story     Ordering User: Madi Lagunas     Refill per verbal order Dr. Boubacar Young.

## 2019-04-19 RX ORDER — LABETALOL 200 MG/1
TABLET, FILM COATED ORAL
Qty: 180 TAB | Refills: 2 | Status: SHIPPED | OUTPATIENT
Start: 2019-04-19 | End: 2020-01-13

## 2019-04-19 NOTE — TELEPHONE ENCOUNTER
Requested Prescriptions     Signed Prescriptions Disp Refills    labetalol (NORMODYNE) 200 mg tablet 180 Tab 2     Sig: TAKE 1 TABLET TWICE A DAY     Authorizing Provider: Norm Ang     Ordering User: Estrella Holbrook     Refill per verbal order Dr. Dede Mcdowell.

## 2019-07-10 DIAGNOSIS — I10 ESSENTIAL HYPERTENSION, BENIGN: ICD-10-CM

## 2019-07-10 RX ORDER — AMLODIPINE BESYLATE 5 MG/1
TABLET ORAL
Qty: 90 TAB | Refills: 2 | Status: SHIPPED | OUTPATIENT
Start: 2019-07-10 | End: 2020-04-06

## 2019-07-10 NOTE — TELEPHONE ENCOUNTER
Requested Prescriptions     Signed Prescriptions Disp Refills    amLODIPine (NORVASC) 5 mg tablet 90 Tab 2     Sig: TAKE 1 TABLET NIGHTLY     Authorizing Provider: Leigh Ann Cohen     Ordering User: Juan Pablo Jones     Refill per verbal order Dr. Elva Schaffer.

## 2019-08-28 ENCOUNTER — OFFICE VISIT (OUTPATIENT)
Dept: CARDIOLOGY CLINIC | Age: 69
End: 2019-08-28

## 2019-08-28 VITALS
RESPIRATION RATE: 16 BRPM | DIASTOLIC BLOOD PRESSURE: 82 MMHG | BODY MASS INDEX: 28.76 KG/M2 | WEIGHT: 217 LBS | OXYGEN SATURATION: 97 % | HEIGHT: 73 IN | HEART RATE: 68 BPM | SYSTOLIC BLOOD PRESSURE: 130 MMHG

## 2019-08-28 DIAGNOSIS — I95.1 ORTHOSTATIC HYPOTENSION: ICD-10-CM

## 2019-08-28 DIAGNOSIS — R00.1 SINUS BRADYCARDIA: ICD-10-CM

## 2019-08-28 DIAGNOSIS — E78.5 DYSLIPIDEMIA: ICD-10-CM

## 2019-08-28 DIAGNOSIS — I10 ESSENTIAL HYPERTENSION, BENIGN: Primary | ICD-10-CM

## 2019-08-28 NOTE — PROGRESS NOTES
Room # 5    Needs labs/lipids  Ordered    Pain in buttocks and thighs when he sits down, rare episodes of palpitations     Extended / Orthostatic Vitals:  Patient Position 2: Supine  BP 2: 130/82  Pulse 2: 68  Patient Position 3: Standing  BP 3: 128/80  Pulse 3: 72

## 2019-08-28 NOTE — PROGRESS NOTES
HISTORY OF PRESENTING ILLNESS      Mik Wilcox is a 79 y.o. male with hypertension, dyslipidemia, RYAN, venous insufficiency, bradycardia.  Echocardiogram demonstrated preserved LV function without wall motion abnormalities and without valvular regurgitation or stenosis. Previously, vital signs demonstrated orthostatic hypotension.  He noted occasional lightheadedness upon standing from seated position, mainly at nighttime. His labetalol was decreased to 200 mg twice daily. Last visit,  he had a 38 mmHg drop in systolic blood pressure however his heart rate remained stable. Today's vitals failed to demonstrate orthostasis. He denies chest pain, syncope, lightheadedness and dizziness. He has occasional buttock and thigh pain however this is not exertional and does not appear to be related to peripheral vascular disease.        ACTIVE PROBLEM LIST     Patient Active Problem List    Diagnosis Date Noted    Orthostatic hypotension 12/29/2016    Dyslipidemia 12/10/2014    RYAN (obstructive sleep apnea) 06/11/2014    Vitamin D deficiency 03/26/2014    Other and unspecified hyperlipidemia 02/05/2014    Essential hypertension, benign 06/27/2012    Venous insufficiency 06/27/2012    Sinus bradycardia 06/27/2012           PAST MEDICAL HISTORY     Past Medical History:   Diagnosis Date    BPH (benign prostatic hyperplasia)     Dyslipidemia 12/10/2014    Essential hypertension     Hyperlipidemia     RYAN (obstructive sleep apnea) 6/11/2014    Syncope     Vitamin D deficiency 3/26/2014           PAST SURGICAL HISTORY     Past Surgical History:   Procedure Laterality Date    HX HEENT      tonsillectomy          ALLERGIES     No Known Allergies       FAMILY HISTORY     Family History   Problem Relation Age of Onset    Cancer Father     negative for cardiac disease       SOCIAL HISTORY     Social History     Socioeconomic History    Marital status:      Spouse name: Not on file    Number of children: Not on file    Years of education: Not on file    Highest education level: Not on file   Tobacco Use    Smoking status: Never Smoker    Smokeless tobacco: Never Used   Substance and Sexual Activity    Alcohol use: Yes     Alcohol/week: 1.0 standard drinks     Types: 1 Standard drinks or equivalent per week     Comment: 1/2 beer weekly     Drug use: No    Sexual activity: Not Currently         MEDICATIONS     Current Outpatient Medications   Medication Sig    amLODIPine (NORVASC) 5 mg tablet TAKE 1 TABLET NIGHTLY    labetalol (NORMODYNE) 200 mg tablet TAKE 1 TABLET TWICE A DAY    atorvastatin (LIPITOR) 20 mg tablet Take 1 Tab by mouth daily.  cholecalciferol (VITAMIN D3) 1,000 unit tablet One pill by mouth daily. (Patient taking differently: 2,000 Units. One pill by mouth daily.)     No current facility-administered medications for this visit. I have reviewed the nurses notes, vitals, problem list, allergy list, medical history, family, social history and medications. REVIEW OF SYMPTOMS      General: Pt denies excessive weight gain or loss. Pt is able to conduct ADL's  HEENT: Denies blurred vision, headaches, hearing loss, epistaxis and difficulty swallowing. Respiratory: Denies cough, congestion, shortness of breath, RENEE, wheezing or stridor. Cardiovascular: Denies precordial pain, palpitations, edema or PND  Gastrointestinal: Denies poor appetite, indigestion, abdominal pain or blood in stool  Genitourinary: Denies hematuria, dysuria, increased urinary frequency  Musculoskeletal: Denies joint pain or swelling from muscles or joints  Neurologic: Denies tremor, paresthesias, headache, or sensory motor disturbance  Psychiatric: Denies confusion, insomnia, depression  Integumentray: Denies rash, itching or ulcers. Hematologic: Denies easy bruising, bleeding       PHYSICAL EXAMINATION      There were no vitals filed for this visit.   General: Well developed, in no acute distress. HEENT: No jaundice, oral mucosa moist, no oral ulcers  Neck: Supple, no stiffness, no lymphadenopathy, supple  Heart:  Normal S1/S2 negative S3 or S4. Regular, no murmur, gallop or rub, no jugular venous distention  Respiratory: Clear bilaterally x 4, no wheezing or rales  Abdomen:   Soft, non-tender, bowel sounds are active.   Extremities:  No edema, normal cap refill, no cyanosis. Musculoskeletal: No clubbing, no deformities  Neuro: A&Ox3, speech clear, gait stable, cooperative, no focal neurologic deficits  Skin: Skin color is normal. No rashes or lesions. Non diaphoretic, moist.  Vascular: 2+ pulses symmetric in all extremities       DIAGNOSTIC DATA      EKG:        LABORATORY DATA      Lab Results   Component Value Date/Time    WBC 6.3 12/16/2015 02:03 PM    HGB 15.7 12/16/2015 02:03 PM    HCT 45.7 12/16/2015 02:03 PM    PLATELET 147 73/64/5130 02:03 PM    MCV 90 12/16/2015 02:03 PM      Lab Results   Component Value Date/Time    Sodium 140 04/03/2018 02:43 PM    Potassium 4.7 04/03/2018 02:43 PM    Chloride 100 04/03/2018 02:43 PM    CO2 23 04/03/2018 02:43 PM    Glucose 76 04/03/2018 02:43 PM    BUN 19 04/03/2018 02:43 PM    Creatinine 1.05 04/03/2018 02:43 PM    BUN/Creatinine ratio 18 04/03/2018 02:43 PM    GFR est AA 86 04/03/2018 02:43 PM    GFR est non-AA 74 04/03/2018 02:43 PM    Calcium 9.6 04/03/2018 02:43 PM    Bilirubin, total 0.9 04/03/2018 02:43 PM    AST (SGOT) 20 04/03/2018 02:43 PM    Alk. phosphatase 75 04/03/2018 02:43 PM    Protein, total 6.5 04/03/2018 02:43 PM    Albumin 4.6 04/03/2018 02:43 PM    A-G Ratio 2.4 (H) 04/03/2018 02:43 PM    ALT (SGPT) 26 04/03/2018 02:43 PM           ASSESSMENT      1. Hypertension  2. Dyslipidemia  3. Venous insufficiency  4. Orthostatic hypotension         PLAN     Continue current drug regimen and hydration. Will obtain lipid panel and LFTs.        FOLLOW-UP     1 year      Thank you, Triston Dubois MD for allowing me to participate in the care of this extraordinarily pleasant male. Please do not hesitate to contact me for further questions/concerns.          Rhianna Lynn MD  Cardiac Electrophysiology / Cardiology    [address]  [address] [facility], [place] [address]  Stephen Oconnor [address]    [address] [facility]  [phone] / [phone] Fax   [phone] / [phone] Fax

## 2019-09-04 ENCOUNTER — HOSPITAL ENCOUNTER (OUTPATIENT)
Dept: LAB | Age: 69
Discharge: HOME | End: 2019-09-04
Payer: MEDICARE

## 2019-09-04 PROCEDURE — 80061 LIPID PANEL: CPT

## 2019-09-04 PROCEDURE — 80053 COMPREHEN METABOLIC PANEL: CPT

## 2019-09-04 PROCEDURE — 36415 COLL VENOUS BLD VENIPUNCTURE: CPT

## 2019-09-05 LAB
ALBUMIN SERPL-MCNC: 4.9 G/DL (ref 3.6–4.8)
ALBUMIN/GLOB SERPL: 2.7 {RATIO} (ref 1.2–2.2)
ALP SERPL-CCNC: 79 IU/L (ref 39–117)
ALT SERPL-CCNC: 39 IU/L (ref 0–44)
AST SERPL-CCNC: 22 IU/L (ref 0–40)
BILIRUB SERPL-MCNC: 0.9 MG/DL (ref 0–1.2)
BUN SERPL-MCNC: 14 MG/DL (ref 8–27)
BUN/CREAT SERPL: 12 (ref 10–24)
CALCIUM SERPL-MCNC: 9.8 MG/DL (ref 8.6–10.2)
CHLORIDE SERPL-SCNC: 105 MMOL/L (ref 96–106)
CHOLEST SERPL-MCNC: 176 MG/DL (ref 100–199)
CO2 SERPL-SCNC: 24 MMOL/L (ref 20–29)
CREAT SERPL-MCNC: 1.14 MG/DL (ref 0.76–1.27)
GLOBULIN SER CALC-MCNC: 1.8 G/DL (ref 1.5–4.5)
GLUCOSE SERPL-MCNC: 91 MG/DL (ref 65–99)
HDLC SERPL-MCNC: 38 MG/DL
INTERPRETATION, 910389: NORMAL
LDLC SERPL CALC-MCNC: 113 MG/DL (ref 0–99)
POTASSIUM SERPL-SCNC: 4.7 MMOL/L (ref 3.5–5.2)
PROT SERPL-MCNC: 6.7 G/DL (ref 6–8.5)
SODIUM SERPL-SCNC: 144 MMOL/L (ref 134–144)
TRIGL SERPL-MCNC: 125 MG/DL (ref 0–149)
VLDLC SERPL CALC-MCNC: 25 MG/DL (ref 5–40)

## 2019-09-17 DIAGNOSIS — E78.5 DYSLIPIDEMIA: ICD-10-CM

## 2019-09-17 RX ORDER — ATORVASTATIN CALCIUM 20 MG/1
20 TABLET, FILM COATED ORAL DAILY
Qty: 90 TAB | Refills: 3 | Status: SHIPPED | OUTPATIENT
Start: 2019-09-17 | End: 2020-08-21 | Stop reason: SDUPTHER

## 2019-09-17 NOTE — TELEPHONE ENCOUNTER
Requested Prescriptions     Signed Prescriptions Disp Refills    atorvastatin (LIPITOR) 20 mg tablet 90 Tab 3     Sig: Take 1 Tab by mouth daily. Authorizing Provider: Charly Linder     Ordering User: Jamison Vazquez     Refill per verbal order Dr. [facility]

## 2020-01-13 RX ORDER — LABETALOL 200 MG/1
200 TABLET, FILM COATED ORAL 2 TIMES DAILY
Qty: 180 TAB | Refills: 3 | Status: SHIPPED | OUTPATIENT
Start: 2020-01-13 | End: 2021-01-07

## 2020-01-13 NOTE — TELEPHONE ENCOUNTER
Requested Prescriptions     Signed Prescriptions Disp Refills    labetalol (NORMODYNE) 200 mg tablet 180 Tab 3     Sig: Take 1 Tab by mouth two (2) times a day. Authorizing Provider: Juan Hicks     Ordering User: Margarette Cassette     Refill per verbal order Dr. Oksana Hammonds.

## 2020-04-05 DIAGNOSIS — I10 ESSENTIAL HYPERTENSION, BENIGN: ICD-10-CM

## 2020-04-06 RX ORDER — AMLODIPINE BESYLATE 5 MG/1
TABLET ORAL
Qty: 90 TAB | Refills: 3 | Status: SHIPPED | OUTPATIENT
Start: 2020-04-06 | End: 2021-07-06

## 2020-08-21 DIAGNOSIS — E78.5 DYSLIPIDEMIA: ICD-10-CM

## 2020-08-21 RX ORDER — ATORVASTATIN CALCIUM 20 MG/1
20 TABLET, FILM COATED ORAL DAILY
Qty: 90 TAB | Refills: 1 | Status: SHIPPED | OUTPATIENT
Start: 2020-08-21 | End: 2021-02-17

## 2020-09-10 NOTE — PROGRESS NOTES
HISTORY OF PRESENTING ILLNESS      Bill Eagle is a 78 y.o. male with hypertension, dyslipidemia, RYAN, venous insufficiency, bradycardia. Echocardiogram demonstrated preserved LV function without wall motion abnormalities and without valvular regurgitation or stenosis. Previously, vital signs demonstrated orthostatic hypotension. He noted occasional lightheadedness upon standing from seated position, mainly at nighttime. His labetalol was decreased to 200 mg twice daily. He has been doing well since last office visit. Mildly orthostatic on exam today, EKG shows sinus rhythm. He reports occasional flutters/dizziness. He also notes more frequent bilateral foot pain, swelling throughout the day- although none is present on exam today. PAST MEDICAL HISTORY     Past Medical History:   Diagnosis Date    BPH (benign prostatic hyperplasia)     Dyslipidemia 12/10/2014    Essential hypertension     Hyperlipidemia     RYAN (obstructive sleep apnea) 6/11/2014    Syncope     Vitamin D deficiency 3/26/2014           PAST SURGICAL HISTORY     Past Surgical History:   Procedure Laterality Date    HX HEENT      tonsillectomy          ALLERGIES     No Known Allergies       FAMILY HISTORY     Family History   Problem Relation Age of Onset    Cancer Father     negative for cardiac disease       SOCIAL HISTORY     Social History     Socioeconomic History    Marital status:      Spouse name: Not on file    Number of children: Not on file    Years of education: Not on file    Highest education level: Not on file   Tobacco Use    Smoking status: Never Smoker    Smokeless tobacco: Never Used   Substance and Sexual Activity    Alcohol use:  Yes     Alcohol/week: 1.0 standard drinks     Types: 1 Standard drinks or equivalent per week     Comment: rarely    Drug use: No    Sexual activity: Not Currently         MEDICATIONS     Current Outpatient Medications   Medication Sig    Omega-3-DHA-EPA-Fish Oil (Fish Oil) 1,200 (144-216) mg cap Take  by mouth daily.  atorvastatin (LIPITOR) 20 mg tablet Take 1 Tab by mouth daily.  amLODIPine (NORVASC) 5 mg tablet TAKE 1 TABLET NIGHTLY    labetalol (NORMODYNE) 200 mg tablet Take 1 Tab by mouth two (2) times a day.  cholecalciferol (VITAMIN D3) 1,000 unit tablet One pill by mouth daily. (Patient taking differently: 1,000 Units. One pill by mouth daily.)     No current facility-administered medications for this visit. I have reviewed the nurses notes, vitals, problem list, allergy list, medical history, family, social history and medications. REVIEW OF SYMPTOMS      General: Pt denies excessive weight gain or loss. Pt is able to conduct ADL's  HEENT: +dizziness, Denies blurred vision, headaches, hearing loss, epistaxis and difficulty swallowing. Respiratory: Denies cough, congestion, shortness of breath, RENEE, wheezing or stridor. Cardiovascular: +flutters, Denies precordial pain, edema or PND  Gastrointestinal: Denies poor appetite, indigestion, abdominal pain or blood in stool  Genitourinary: Denies hematuria, dysuria, increased urinary frequency  Musculoskeletal: +naveed foot pain/swelling, Denies joint pain or swelling from muscles or joints  Neurologic: Denies tremor, paresthesias, headache, or sensory motor disturbance  Psychiatric: Denies confusion, insomnia, depression  Integumentray: Denies rash, itching or ulcers. Hematologic: Denies easy bruising, bleeding       PHYSICAL EXAMINATION      Vitals: see vitals section  General: Well developed, in no acute distress. HEENT: No jaundice, oral mucosa moist, no oral ulcers  Neck: Supple, no stiffness, no lymphadenopathy, supple  Heart:  Normal S1/S2 negative S3 or S4. Regular, no murmur, gallop or rub, no jugular venous distention  Respiratory: Clear bilaterally x 4, no wheezing or rales  Abdomen:   Soft, non-tender, bowel sounds are active.    Extremities:  No edema, normal cap refill, no cyanosis. Musculoskeletal: No clubbing, no deformities  Neuro: A&Ox3, speech clear, gait stable, cooperative, no focal neurologic deficits  Skin: Skin color is normal. No rashes or lesions. Non diaphoretic, moist.  Vascular: 2+ pulses symmetric in all extremities       DIAGNOSTIC DATA      EKG: sinus rhythm        LABORATORY DATA      Lab Results   Component Value Date/Time    WBC 6.3 12/16/2015 02:03 PM    HGB 15.7 12/16/2015 02:03 PM    HCT 45.7 12/16/2015 02:03 PM    PLATELET 527 11/92/1817 02:03 PM    MCV 90 12/16/2015 02:03 PM      Lab Results   Component Value Date/Time    Sodium 144 09/04/2019 02:19 PM    Potassium 4.7 09/04/2019 02:19 PM    Chloride 105 09/04/2019 02:19 PM    CO2 24 09/04/2019 02:19 PM    Glucose 91 09/04/2019 02:19 PM    BUN 14 09/04/2019 02:19 PM    Creatinine 1.14 09/04/2019 02:19 PM    BUN/Creatinine ratio 12 09/04/2019 02:19 PM    GFR est AA 77 09/04/2019 02:19 PM    GFR est non-AA 66 09/04/2019 02:19 PM    Calcium 9.8 09/04/2019 02:19 PM    Bilirubin, total 0.9 09/04/2019 02:19 PM    Alk. phosphatase 79 09/04/2019 02:19 PM    Protein, total 6.7 09/04/2019 02:19 PM    Albumin 4.9 (H) 09/04/2019 02:19 PM    A-G Ratio 2.7 (H) 09/04/2019 02:19 PM    ALT (SGPT) 39 09/04/2019 02:19 PM           ASSESSMENT      1. Hypertension  2. Dyslipidemia  3. Venous insufficiency  4. Orthostatic hypotension        PLAN     Offered referral for venous insufficiency/bilateral foot pain; however, he deferred for now and plans to discuss with PCP. He will notify me if he changes his mind. Provided LFTs/Lipid panel to be completed for statin refill. Continue other current medical therapy. ICD-10-CM ICD-9-CM    1. Sinus bradycardia  R00.1 427.89 AMB POC EKG ROUTINE W/ 12 LEADS, INTER & REP      LIPID PANEL      HEPATIC FUNCTION PANEL   2. Essential hypertension, benign  I10 401.1 AMB POC EKG ROUTINE W/ 12 LEADS, INTER & REP      LIPID PANEL      HEPATIC FUNCTION PANEL   3.  Dyslipidemia E78.5 272.4 LIPID PANEL      HEPATIC FUNCTION PANEL   4. Orthostatic hypotension  I95.1 458.0    5. RYAN (obstructive sleep apnea)  G47.33 327.23      Orders Placed This Encounter    LIPID PANEL     Standing Status:   Future     Standing Expiration Date:   9/11/2021    LIVER FUNCTION PANEL     Standing Status:   Future     Standing Expiration Date:   9/11/2021    AMB POC EKG ROUTINE W/ 12 LEADS, INTER & REP     Order Specific Question:   Reason for Exam:     Answer:   HTN    Omega-3-DHA-EPA-Fish Oil (Fish Oil) 1,200 (144-216) mg cap     Sig: Take  by mouth daily. FOLLOW-UP   1 year    Thank you, Yenny Grider MD for allowing me to participate in the care of this extraordinarily pleasant male. Please do not hesitate to contact me for further questions/concerns.        Loanne Severance, NP [facility].  [address]  [phone] / [phone] Fax   [phone] / [phone] Fax

## 2020-09-11 ENCOUNTER — OFFICE VISIT (OUTPATIENT)
Dept: CARDIOLOGY CLINIC | Age: 70
End: 2020-09-11
Payer: MEDICARE

## 2020-09-11 VITALS
DIASTOLIC BLOOD PRESSURE: 84 MMHG | BODY MASS INDEX: 28.2 KG/M2 | RESPIRATION RATE: 16 BRPM | SYSTOLIC BLOOD PRESSURE: 136 MMHG | HEIGHT: 73 IN | WEIGHT: 212.8 LBS | OXYGEN SATURATION: 94 % | HEART RATE: 74 BPM

## 2020-09-11 DIAGNOSIS — I10 ESSENTIAL HYPERTENSION, BENIGN: ICD-10-CM

## 2020-09-11 DIAGNOSIS — I95.1 ORTHOSTATIC HYPOTENSION: ICD-10-CM

## 2020-09-11 DIAGNOSIS — G47.33 OSA (OBSTRUCTIVE SLEEP APNEA): ICD-10-CM

## 2020-09-11 DIAGNOSIS — R00.1 SINUS BRADYCARDIA: Primary | ICD-10-CM

## 2020-09-11 DIAGNOSIS — E78.5 DYSLIPIDEMIA: ICD-10-CM

## 2020-09-11 PROCEDURE — G8536 NO DOC ELDER MAL SCRN: HCPCS | Performed by: NURSE PRACTITIONER

## 2020-09-11 PROCEDURE — G8419 CALC BMI OUT NRM PARAM NOF/U: HCPCS | Performed by: NURSE PRACTITIONER

## 2020-09-11 PROCEDURE — G8754 DIAS BP LESS 90: HCPCS | Performed by: NURSE PRACTITIONER

## 2020-09-11 PROCEDURE — G8427 DOCREV CUR MEDS BY ELIG CLIN: HCPCS | Performed by: NURSE PRACTITIONER

## 2020-09-11 PROCEDURE — G8752 SYS BP LESS 140: HCPCS | Performed by: NURSE PRACTITIONER

## 2020-09-11 PROCEDURE — 99215 OFFICE O/P EST HI 40 MIN: CPT | Performed by: NURSE PRACTITIONER

## 2020-09-11 PROCEDURE — 93010 ELECTROCARDIOGRAM REPORT: CPT | Performed by: NURSE PRACTITIONER

## 2020-09-11 PROCEDURE — G0463 HOSPITAL OUTPT CLINIC VISIT: HCPCS | Performed by: NURSE PRACTITIONER

## 2020-09-11 PROCEDURE — 1101F PT FALLS ASSESS-DOCD LE1/YR: CPT | Performed by: NURSE PRACTITIONER

## 2020-09-11 PROCEDURE — G8432 DEP SCR NOT DOC, RNG: HCPCS | Performed by: NURSE PRACTITIONER

## 2020-09-11 PROCEDURE — 93005 ELECTROCARDIOGRAM TRACING: CPT | Performed by: NURSE PRACTITIONER

## 2020-09-11 PROCEDURE — 3017F COLORECTAL CA SCREEN DOC REV: CPT | Performed by: NURSE PRACTITIONER

## 2020-09-11 RX ORDER — MELATONIN 2.5MG/10ML
LIQUID (ML) ORAL DAILY
COMMUNITY
End: 2021-09-27

## 2020-09-11 NOTE — PROGRESS NOTES
ROOM # 2  No ER or hospital visits since LOV  Numbness and pain both feet, Rare dizziness and palpitations    Extended / Orthostatic Vitals:  Patient Position 2: Supine  BP 2: 136/84  Pulse 2: 74  Patient Position 3: Standing  BP 3: 122/80  Pulse 3: 90

## 2020-09-15 ENCOUNTER — TELEPHONE (OUTPATIENT)
Dept: CARDIOLOGY CLINIC | Age: 70
End: 2020-09-15

## 2020-09-15 LAB
ALBUMIN SERPL-MCNC: 4.7 G/DL (ref 3.8–4.8)
ALP SERPL-CCNC: 76 IU/L (ref 39–117)
ALT SERPL-CCNC: 36 IU/L (ref 0–44)
AST SERPL-CCNC: 18 IU/L (ref 0–40)
BILIRUB DIRECT SERPL-MCNC: 0.22 MG/DL (ref 0–0.4)
BILIRUB SERPL-MCNC: 0.9 MG/DL (ref 0–1.2)
CHOLEST SERPL-MCNC: 157 MG/DL (ref 100–199)
HDLC SERPL-MCNC: 39 MG/DL
INTERPRETATION, 910389: NORMAL
LDLC SERPL CALC-MCNC: 95 MG/DL (ref 0–99)
PROT SERPL-MCNC: 6.5 G/DL (ref 6–8.5)
TRIGL SERPL-MCNC: 128 MG/DL (ref 0–149)
VLDLC SERPL CALC-MCNC: 23 MG/DL (ref 5–40)

## 2020-09-15 NOTE — TELEPHONE ENCOUNTER
----- Message from Loanne Severance, NP sent at 9/15/2020  8:05 AM EDT -----  Lipid pane looks good. Work on increasing HDL with good fats like avocado/olive oil, etc. OK to refill statin. Called patient, ID verified using two patient identifiers. Informed patient of above results and instructions per L. Jenine Oppenheim NP. Patient verbalizes understanding of all information.

## 2020-09-15 NOTE — PROGRESS NOTES
Lipid pane looks good. Work on increasing HDL with good fats like avocado/olive oil, etc. OK to refill statin.

## 2021-01-07 RX ORDER — LABETALOL 200 MG/1
TABLET, FILM COATED ORAL
Qty: 180 TAB | Refills: 3 | Status: SHIPPED | OUTPATIENT
Start: 2021-01-07 | End: 2021-09-27 | Stop reason: SDUPTHER

## 2021-02-17 DIAGNOSIS — E78.5 DYSLIPIDEMIA: ICD-10-CM

## 2021-02-17 RX ORDER — ATORVASTATIN CALCIUM 20 MG/1
TABLET, FILM COATED ORAL
Qty: 90 TAB | Refills: 3 | Status: SHIPPED | OUTPATIENT
Start: 2021-02-17 | End: 2021-09-27 | Stop reason: SDUPTHER

## 2021-07-01 DIAGNOSIS — I10 ESSENTIAL HYPERTENSION, BENIGN: ICD-10-CM

## 2021-07-06 RX ORDER — AMLODIPINE BESYLATE 5 MG/1
TABLET ORAL
Qty: 90 TABLET | Refills: 3 | Status: SHIPPED | OUTPATIENT
Start: 2021-07-06 | End: 2021-09-27 | Stop reason: SDUPTHER

## 2021-09-01 ENCOUNTER — TELEPHONE (OUTPATIENT)
Dept: CARDIOLOGY CLINIC | Age: 71
End: 2021-09-01

## 2021-09-01 NOTE — TELEPHONE ENCOUNTER
Patient states Dee Montiel got labs done from [facility], and wants to know if they cover the labs needed for NP [facility]. \"    I gave patient our fax # to have [facility] send them to see if they are applicable to the labs needed.     PHONE: [phone]

## 2021-09-02 NOTE — TELEPHONE ENCOUNTER
Manuel Fox NP  You 8 minutes ago (9:16 AM)   LP  Yes everything is there that we need. Thanks! Returned patient call, ID verified using two patient identifiers. Notified patient that we received his faxed lab results from [address] [place] [address], NP reviewed his labs and said there is nothing else needed. Patient verbalized understanding and will call with any other questions.       Future Appointments   Date Time Provider Shikha Mullen   9/17/2021 10:40 AM VEGA Corley BS AMB

## 2021-09-27 ENCOUNTER — OFFICE VISIT (OUTPATIENT)
Dept: CARDIOLOGY CLINIC | Age: 71
End: 2021-09-27
Payer: MEDICARE

## 2021-09-27 VITALS
HEART RATE: 76 BPM | WEIGHT: 210.4 LBS | RESPIRATION RATE: 14 BRPM | SYSTOLIC BLOOD PRESSURE: 116 MMHG | OXYGEN SATURATION: 96 % | BODY MASS INDEX: 27.89 KG/M2 | HEIGHT: 73 IN | DIASTOLIC BLOOD PRESSURE: 72 MMHG

## 2021-09-27 DIAGNOSIS — E78.5 DYSLIPIDEMIA: ICD-10-CM

## 2021-09-27 DIAGNOSIS — R00.1 SINUS BRADYCARDIA: Primary | ICD-10-CM

## 2021-09-27 DIAGNOSIS — I10 ESSENTIAL HYPERTENSION, BENIGN: ICD-10-CM

## 2021-09-27 PROCEDURE — 3017F COLORECTAL CA SCREEN DOC REV: CPT | Performed by: NURSE PRACTITIONER

## 2021-09-27 PROCEDURE — G8754 DIAS BP LESS 90: HCPCS | Performed by: NURSE PRACTITIONER

## 2021-09-27 PROCEDURE — 1101F PT FALLS ASSESS-DOCD LE1/YR: CPT | Performed by: NURSE PRACTITIONER

## 2021-09-27 PROCEDURE — G8419 CALC BMI OUT NRM PARAM NOF/U: HCPCS | Performed by: NURSE PRACTITIONER

## 2021-09-27 PROCEDURE — G8536 NO DOC ELDER MAL SCRN: HCPCS | Performed by: NURSE PRACTITIONER

## 2021-09-27 PROCEDURE — G0463 HOSPITAL OUTPT CLINIC VISIT: HCPCS | Performed by: NURSE PRACTITIONER

## 2021-09-27 PROCEDURE — G8427 DOCREV CUR MEDS BY ELIG CLIN: HCPCS | Performed by: NURSE PRACTITIONER

## 2021-09-27 PROCEDURE — G8752 SYS BP LESS 140: HCPCS | Performed by: NURSE PRACTITIONER

## 2021-09-27 PROCEDURE — 99214 OFFICE O/P EST MOD 30 MIN: CPT | Performed by: NURSE PRACTITIONER

## 2021-09-27 PROCEDURE — G8432 DEP SCR NOT DOC, RNG: HCPCS | Performed by: NURSE PRACTITIONER

## 2021-09-27 PROCEDURE — 93010 ELECTROCARDIOGRAM REPORT: CPT | Performed by: NURSE PRACTITIONER

## 2021-09-27 PROCEDURE — 93005 ELECTROCARDIOGRAM TRACING: CPT | Performed by: NURSE PRACTITIONER

## 2021-09-27 RX ORDER — ASPIRIN 325 MG
650 TABLET ORAL AS NEEDED
COMMUNITY
End: 2022-10-19

## 2021-09-27 RX ORDER — ATORVASTATIN CALCIUM 20 MG/1
TABLET, FILM COATED ORAL
Qty: 90 TABLET | Refills: 3 | Status: SHIPPED | OUTPATIENT
Start: 2021-09-27

## 2021-09-27 RX ORDER — LANOLIN ALCOHOL/MO/W.PET/CERES
1000 CREAM (GRAM) TOPICAL DAILY
COMMUNITY
End: 2022-10-19

## 2021-09-27 RX ORDER — VITAMIN E 1000 UNIT
1000 CAPSULE ORAL DAILY
COMMUNITY

## 2021-09-27 RX ORDER — AMLODIPINE BESYLATE 2.5 MG/1
2.5 TABLET ORAL DAILY
Qty: 90 TABLET | Refills: 3 | Status: SHIPPED | OUTPATIENT
Start: 2021-09-27 | End: 2022-09-16 | Stop reason: SDUPTHER

## 2021-09-27 RX ORDER — IBUPROFEN 200 MG
200 TABLET ORAL
COMMUNITY

## 2021-09-27 RX ORDER — LABETALOL 200 MG/1
TABLET, FILM COATED ORAL
Qty: 180 TABLET | Refills: 3 | Status: SHIPPED | OUTPATIENT
Start: 2021-09-27 | End: 2022-10-10 | Stop reason: SDUPTHER

## 2021-09-27 NOTE — PROGRESS NOTES
HISTORY OF PRESENTING ILLNESS      Kylie Galloway is a 73 y.o. male with hypertension, dyslipidemia, RYAN, venous insufficiency, bradycardia here for annual follow up. Echocardiogram demonstrated preserved LV function without wall motion abnormalities and without valvular regurgitation or stenosis. He denies cardiac complaints aside from occasional palpitations, very infrequent. Recent labwork sent by PCP showed normal findings. PAST MEDICAL HISTORY     Past Medical History:   Diagnosis Date    BPH (benign prostatic hyperplasia)     Dyslipidemia 12/10/2014    Essential hypertension     Hyperlipidemia     RYAN (obstructive sleep apnea) 6/11/2014    Syncope     Vitamin D deficiency 3/26/2014           PAST SURGICAL HISTORY     Past Surgical History:   Procedure Laterality Date    HX HEENT      tonsillectomy          ALLERGIES     No Known Allergies       FAMILY HISTORY     Family History   Problem Relation Age of Onset    Cancer Father     negative for cardiac disease       SOCIAL HISTORY     Social History     Socioeconomic History    Marital status:      Spouse name: Not on file    Number of children: Not on file    Years of education: Not on file    Highest education level: Not on file   Tobacco Use    Smoking status: Never Smoker    Smokeless tobacco: Never Used   Substance and Sexual Activity    Alcohol use: Yes     Alcohol/week: 1.0 standard drinks     Types: 1 Standard drinks or equivalent per week     Comment: rarely    Drug use: No    Sexual activity: Not Currently     Social Determinants of Health     Financial Resource Strain:     Difficulty of Paying Living Expenses:    Food Insecurity:     Worried About Running Out of Food in the Last Year:     Ran Out of Food in the Last Year:    Transportation Needs:     Lack of Transportation (Medical):      Lack of Transportation (Non-Medical):    Physical Activity:     Days of Exercise per Week:     Minutes of Exercise per Session:    Stress:     Feeling of Stress :    Social Connections:     Frequency of Communication with Friends and Family:     Frequency of Social Gatherings with Friends and Family:     Attends Yazidism Services:     Active Member of Clubs or Organizations:     Attends Club or Organization Meetings:     Marital Status:          MEDICATIONS     Current Outpatient Medications   Medication Sig    amLODIPine (NORVASC) 5 mg tablet TAKE 1 TABLET NIGHTLY    atorvastatin (LIPITOR) 20 mg tablet TAKE 1 TABLET DAILY    labetaloL (NORMODYNE) 200 mg tablet TAKE 1 TABLET TWICE A DAY    Omega-3-DHA-EPA-Fish Oil (Fish Oil) 1,200 (144-216) mg cap Take  by mouth daily.  cholecalciferol (VITAMIN D3) 1,000 unit tablet One pill by mouth daily. (Patient taking differently: 1,000 Units. One pill by mouth daily.)     No current facility-administered medications for this visit. I have reviewed the nurses notes, vitals, problem list, allergy list, medical history, family, social history and medications. REVIEW OF SYMPTOMS      General: Pt denies excessive weight gain or loss. Pt is able to conduct ADL's  HEENT: +dizziness, Denies blurred vision, headaches, hearing loss, epistaxis and difficulty swallowing. Respiratory: Denies cough, congestion, shortness of breath, RENEE, wheezing or stridor. Cardiovascular: +flutters, Denies precordial pain, edema or PND  Gastrointestinal: Denies poor appetite, indigestion, abdominal pain or blood in stool  Genitourinary: Denies hematuria, dysuria, increased urinary frequency  Musculoskeletal: +naveed foot pain/swelling, Denies joint pain or swelling from muscles or joints  Neurologic: Denies tremor, paresthesias, headache, or sensory motor disturbance  Psychiatric: Denies confusion, insomnia, depression  Integumentray: Denies rash, itching or ulcers.   Hematologic: Denies easy bruising, bleeding       PHYSICAL EXAMINATION      Vitals: see vitals section  General: Well developed, in no acute distress. HEENT: No jaundice, oral mucosa moist, no oral ulcers  Neck: Supple, no stiffness, no lymphadenopathy, supple  Heart:  Normal S1/S2 negative S3 or S4. Regular, no murmur, gallop or rub, no jugular venous distention  Respiratory: Clear bilaterally x 4, no wheezing or rales  Abdomen:   Soft, non-tender, bowel sounds are active. Extremities:  No edema, normal cap refill, no cyanosis. Musculoskeletal: No clubbing, no deformities  Neuro: A&Ox3, speech clear, gait stable, cooperative, no focal neurologic deficits  Skin: Skin color is normal. No rashes or lesions. Non diaphoretic, moist.  Vascular: 2+ pulses symmetric in all extremities       DIAGNOSTIC DATA      EKG: sinus rhythm        LABORATORY DATA      Lab Results   Component Value Date/Time    WBC 6.3 12/16/2015 02:03 PM    HGB 15.7 12/16/2015 02:03 PM    HCT 45.7 12/16/2015 02:03 PM    PLATELET 813 31/21/1880 02:03 PM    MCV 90 12/16/2015 02:03 PM      Lab Results   Component Value Date/Time    Sodium 144 09/04/2019 02:19 PM    Potassium 4.7 09/04/2019 02:19 PM    Chloride 105 09/04/2019 02:19 PM    CO2 24 09/04/2019 02:19 PM    Glucose 91 09/04/2019 02:19 PM    BUN 14 09/04/2019 02:19 PM    Creatinine 1.14 09/04/2019 02:19 PM    BUN/Creatinine ratio 12 09/04/2019 02:19 PM    GFR est AA 77 09/04/2019 02:19 PM    GFR est non-AA 66 09/04/2019 02:19 PM    Calcium 9.8 09/04/2019 02:19 PM    Bilirubin, total 0.9 09/14/2020 09:46 AM    Alk. phosphatase 76 09/14/2020 09:46 AM    Protein, total 6.5 09/14/2020 09:46 AM    Albumin 4.7 09/14/2020 09:46 AM    A-G Ratio 2.7 (H) 09/04/2019 02:19 PM    ALT (SGPT) 36 09/14/2020 09:46 AM           ASSESSMENT      1. Hypertension  2. Dyslipidemia  3. Venous insufficiency  4. Orthostatic hypotension        PLAN     His blood pressures have been normotensive at home, today's reading 116/72. He will decrease amlodipine to 2.5mg daily and continue to monitor blood pressures at home.  Should he experience increased trends, will restart Amlodipine 5mg daily. Continue labetalol and Lipitor, annual lipid panel and CBC/BMP. FOLLOW-UP     1 year    Thank you, Jitendra Hopper MD for allowing me to participate in the care of this extraordinarily pleasant male. Please do not hesitate to contact me for further questions/concerns.        Shay Lauren, [facility] [address]  [address]  [phone] / [phone] Fax   [phone] / [phone] Fax

## 2021-09-27 NOTE — PROGRESS NOTES
Room EP 2  Visit Vitals  /72 (BP 1 Location: Left upper arm, BP Patient Position: Sitting)   Pulse 76   Resp 14   Ht 6' 1\" (1.854 m)   Wt 210 lb 6.4 oz (95.4 kg)   SpO2 96%   BMI 27.76 kg/m²       Chest pain: no  Shortness of breath: no  Edema: no  Palpitations, Skipped beats, Rapid heartbeat: yes, 2 brief episodes  Dizziness: no  Fatigue:no    New diagnosis/Surgeries: no    ER/Hospitalizations: no    Refills:no

## 2021-09-27 NOTE — LETTER
9/27/2021    Patient: Ananth Felix   YOB: 1950   Date of Visit: 9/27/2021     Gwendel Felty, [address] [place] [address]  Via Fax: [phone]    Dear Gwendel Felty, MD,      Thank you for referring Mr. Jia Jones to [address] [place], [place] [address]  for evaluation. My notes for this consultation are attached. If you have questions, please do not hesitate to call me. I look forward to following your patient along with you.       Sincerely,    Sarika Cheng NP

## 2022-09-16 DIAGNOSIS — I10 ESSENTIAL HYPERTENSION, BENIGN: ICD-10-CM

## 2022-09-16 RX ORDER — AMLODIPINE BESYLATE 2.5 MG/1
2.5 TABLET ORAL DAILY
Qty: 90 TABLET | Refills: 1 | Status: SHIPPED | OUTPATIENT
Start: 2022-09-16

## 2022-09-16 NOTE — TELEPHONE ENCOUNTER
Requested Prescriptions     Signed Prescriptions Disp Refills    amLODIPine (NORVASC) 2.5 mg tablet 90 Tablet 1     Sig: Take 1 Tablet by mouth daily. Authorizing Provider: GERHARD VALERIO     Ordering User: Dexter Boles     Refill per verbal order Dr. Flor Hernández.   Last visit:9/27/21 Yandel Main NP)  Next visit: 10/19/22 (Dr. Roxane Alvarez)

## 2022-10-10 RX ORDER — LABETALOL 200 MG/1
200 TABLET, FILM COATED ORAL 2 TIMES DAILY
Qty: 180 TABLET | Refills: 3 | Status: SHIPPED | OUTPATIENT
Start: 2022-10-10

## 2022-10-19 ENCOUNTER — OFFICE VISIT (OUTPATIENT)
Dept: CARDIOLOGY CLINIC | Age: 72
End: 2022-10-19
Payer: MEDICARE

## 2022-10-19 VITALS
DIASTOLIC BLOOD PRESSURE: 84 MMHG | WEIGHT: 216.4 LBS | HEIGHT: 73 IN | SYSTOLIC BLOOD PRESSURE: 132 MMHG | RESPIRATION RATE: 18 BRPM | BODY MASS INDEX: 28.68 KG/M2 | OXYGEN SATURATION: 96 %

## 2022-10-19 DIAGNOSIS — I10 ESSENTIAL HYPERTENSION, BENIGN: Primary | ICD-10-CM

## 2022-10-19 DIAGNOSIS — E78.5 DYSLIPIDEMIA: ICD-10-CM

## 2022-10-19 DIAGNOSIS — M79.605 PAIN OF LEFT LOWER EXTREMITY: ICD-10-CM

## 2022-10-19 DIAGNOSIS — G47.33 OSA (OBSTRUCTIVE SLEEP APNEA): ICD-10-CM

## 2022-10-19 DIAGNOSIS — R00.1 SINUS BRADYCARDIA: ICD-10-CM

## 2022-10-19 PROBLEM — M79.606 LEG PAIN: Status: ACTIVE | Noted: 2022-10-19

## 2022-10-19 PROCEDURE — 93010 ELECTROCARDIOGRAM REPORT: CPT | Performed by: INTERNAL MEDICINE

## 2022-10-19 PROCEDURE — 99214 OFFICE O/P EST MOD 30 MIN: CPT | Performed by: INTERNAL MEDICINE

## 2022-10-19 PROCEDURE — 1123F ACP DISCUSS/DSCN MKR DOCD: CPT | Performed by: INTERNAL MEDICINE

## 2022-10-19 PROCEDURE — 93005 ELECTROCARDIOGRAM TRACING: CPT | Performed by: INTERNAL MEDICINE

## 2022-10-19 PROCEDURE — G0463 HOSPITAL OUTPT CLINIC VISIT: HCPCS | Performed by: INTERNAL MEDICINE

## 2022-10-19 NOTE — PROGRESS NOTES
Cardiac Electrophysiology Office Follow-up    NAME: Sis Huffman   :  1950  MRM:  [id number]    Date:  10/19/2022         Assessment and Plan:     1. Essential hypertension, benign  -     AMB POC EKG ROUTINE W/ 12 LEADS, INTER & REP  2. Sinus bradycardia  -     AMB POC EKG ROUTINE W/ 12 LEADS, INTER & REP  3. RYAN (obstructive sleep apnea)  -     AMB POC EKG ROUTINE W/ 12 LEADS, INTER & REP  4. Dyslipidemia  -     AMB POC EKG ROUTINE W/ 12 LEADS, INTER & REP  5. Pain of left lower extremity       Hypertension  Well-controlled on current regimen  - Continue amlodipine 2.5 mg daily  - Continue labetalol 2 mg twice daily  - BP is well controlled on the current regimen. No change in antihypertensive medications today. Recommended routine exercise and low sodium diet. Left lower extremity discomfort  Patient reports occasional discomfort in his left leg with some mild loss of hair relative to the right. Denies any claudication  - We will proceed with bilateral J CARLOS    RYAN  History of RYAN on CPAP. We spoke regarding the associated between RYAN and its role in AFib pathophysiology. I emphasized the importance of RYAN diagnosis and the need CPAP compliance. Sinus bradycardia  History of syncope likely vascular related. No recurrence. EKG demonstrated normal sinus rhythm in the 70s. Follow-up with nurse practitioner in 1 year                     ATTENTION:   This medical record was transcribed using an electronic medical records/speech recognition system. Although proofread, it may and can contain electronic, spelling and other errors. Corrections may be executed at a later time. Please feel free to contact us for any clarifications as needed. Subjective:     Sis Huffman, a 81y.o. year-old who presents for followup of hypertension, dyslipidemia, RYAN, venous insufficiency, bradycardia here for annual follow up.   Echocardiogram demonstrated preserved LV function without wall motion abnormalities and without valvular regurgitation or stenosis. He denies cardiac complaints aside from occasional palpitations, very infrequent. Reports mild left leg discomfort with some decrease in hair in the left leg compared to the right. Denies of any claudication. ROS:  12 point review system was reviewed. Negative except noted in HPI. Exam:     Physical Exam:  /84 (BP 1 Location: Left upper arm, BP Patient Position: Sitting, BP Cuff Size: Adult)   Resp 18   Ht 6' 1\" (1.854 m)   Wt 216 lb 6.4 oz (98.2 kg)   SpO2 96%   BMI 28.55 kg/m²     PHYSICAL EXAM  General:  Alert and oriented, in no acute distress  Head:  Atraumatic, normocephalic  Eyes:  extraocular muscles intact  Neck:  Supple, normal range of motion  Lungs:  Clear to auscultation bilaterally, no wheezes/rales/rhonchi   Cardiovascular:  Regular rate and rhythm, normal S1-S2, no murmurs/rubs/gallops  Abdomen:  Soft, nontender, nondistended, normoactive bowel sounds  Skin:  Intact, no rash  Extremities:, no clubbing, cyanosis, or edema  Musculoskeletal: normal range of motion  Neurological:  Alert and oriented, no focal neurologic deficits  Psychiatric:  Normal mood and affect      Medications:     Current Outpatient Medications   Medication Sig    multivit-minerals/folic acid (ADULT MULTIVITAMIN GUMMIES PO) Take  by mouth daily. labetaloL (NORMODYNE) 200 mg tablet Take 1 Tablet by mouth two (2) times a day. amLODIPine (NORVASC) 2.5 mg tablet Take 1 Tablet by mouth daily. ibuprofen (MOTRIN) 200 mg tablet Take 200 mg by mouth two (2) times daily as needed for Pain. atorvastatin (LIPITOR) 20 mg tablet TAKE 1 TABLET DAILY    zinc 50 mg tab tablet Take 50 mg by mouth daily. (Patient not taking: Reported on 10/19/2022)    ascorbic acid, vitamin C, (VITAMIN C) 1,000 mg tablet Take 1,000 mg by mouth daily. cyanocobalamin 1,000 mcg tablet Take 1,000 mcg by mouth daily.  (Patient not taking: Reported on 10/19/2022)    aspirin (ASPIRIN) 325 mg tablet Take 650 mg by mouth as needed for Pain. (Patient not taking: Reported on 10/19/2022)    cholecalciferol (VITAMIN D3) 1,000 unit tablet One pill by mouth daily. (Patient taking differently: 1,000 Units. One pill by mouth daily.)     No current facility-administered medications for this visit. Diagnostic Data Review:       Results for orders placed or performed in visit on 12/16/15   AMB POC EKG ROUTINE W/ 12 LEADS, INTER & REP    Narrative    See scanned documents. Echo 6/27/12- Normal, LVEF 60%  Echo 1/6/16- Normal, EF 60%      Lab Review:     Lab Results   Component Value Date/Time    Cholesterol, total 157 09/14/2020 09:46 AM    HDL Cholesterol 39 (L) 09/14/2020 09:46 AM    LDL, calculated 95 09/14/2020 09:46 AM    LDL, calculated 113 (H) 09/04/2019 02:19 PM    Triglyceride 128 09/14/2020 09:46 AM     Lab Results   Component Value Date/Time    Creatinine 1.14 09/04/2019 02:19 PM     Lab Results   Component Value Date/Time    BUN 14 09/04/2019 02:19 PM     Lab Results   Component Value Date/Time    Potassium 4.7 09/04/2019 02:19 PM     No results found for: HBA1C, DLO8YBFX, GFJ8IKGC  Lab Results   Component Value Date/Time    HGB 15.7 12/16/2015 02:03 PM     Lab Results   Component Value Date/Time    PLATELET 458 50/85/0896 02:03 PM     No results for input(s): CPK, CKMB, TROIQ in the last 72 hours. No lab exists for component: CKQMB, CPKMB             ___________________________________________________    An Randolph Castro MD, [facility], [address] [place] [address] [facility] [place]  [address] [facility], [address] [place] [address] Avenue                             [phone] [address] [facility] [employer] [facility].  [address] [place] [address], [id number] [place] [address]  [phone]

## 2022-10-19 NOTE — PROGRESS NOTES
Room #: 3    Would like to talk about a referral to see a vascular specialist - c/o numbness, swelling on bilateral lower extremities. Needs a refill on his Labetalol. Chief Complaint   Patient presents with    Hypertension    Slow Heart Rate     Bradycardia       Visit Vitals  /84 (BP 1 Location: Left upper arm, BP Patient Position: Sitting, BP Cuff Size: Adult)   Resp 18   Ht 6' 1\" (1.854 m)   Wt 216 lb 6.4 oz (98.2 kg)   SpO2 96%   BMI 28.55 kg/m²         Chest pain:  NO  Shortness of breath:  NO  Edema: NO  Palpitations, skipped beats, rapid heartbeat:   NO  Dizziness:  NO    1. Have you been to the ER, urgent care clinic since your last visit? Hospitalized since your last visit? No    2. Have you seen or consulted any other health care providers outside of the [address] [place] [address] since your last visit? Include any pap smears or colon screening.  No      Refills:  YES

## 2022-11-07 ENCOUNTER — ANCILLARY PROCEDURE (OUTPATIENT)
Dept: CARDIOLOGY CLINIC | Age: 72
End: 2022-11-07
Payer: MEDICARE

## 2022-11-07 VITALS — BODY MASS INDEX: 28.63 KG/M2 | HEIGHT: 73 IN | WEIGHT: 216 LBS

## 2022-11-07 DIAGNOSIS — E78.5 DYSLIPIDEMIA: ICD-10-CM

## 2022-11-07 DIAGNOSIS — M79.605 PAIN OF LEFT LOWER EXTREMITY: ICD-10-CM

## 2022-11-07 LAB
LEFT ABI: 1.29
LEFT ARM BP: 140 MMHG
LEFT POSTERIOR TIBIAL: 179 MMHG
RIGHT ABI: 1.29
RIGHT ARM BP: 131 MMHG
RIGHT POSTERIOR TIBIAL: 181 MMHG
VAS LEFT DORSALIS PEDIS BP: 181 MMHG
VAS RIGHT DORSALIS PEDIS BP: 165 MMHG

## 2022-11-07 PROCEDURE — 93922 UPR/L XTREMITY ART 2 LEVELS: CPT | Performed by: SPECIALIST

## 2022-11-08 DIAGNOSIS — I10 ESSENTIAL HYPERTENSION, BENIGN: ICD-10-CM

## 2022-11-08 DIAGNOSIS — E78.5 DYSLIPIDEMIA: Primary | ICD-10-CM

## 2022-11-08 RX ORDER — ATORVASTATIN CALCIUM 20 MG/1
TABLET, FILM COATED ORAL
Qty: 90 TABLET | Refills: 1 | Status: SHIPPED | OUTPATIENT
Start: 2022-11-08

## 2022-11-08 NOTE — TELEPHONE ENCOUNTER
Requested Prescriptions     Signed Prescriptions Disp Refills    atorvastatin (LIPITOR) 20 mg tablet 90 Tablet 1     Sig: TAKE 1 TABLET DAILY     Authorizing Provider: Ephraim Quezada     Ordering User: Van Murphy     Refills per verbal order from Noreen Cheng NP  Last visit: 10/19/22 (Dr. Colin Laboy)  Next visit: 11/1/23  Need to get updated labs done for further refills. Lab slips mailed to patient's home address.

## 2022-11-10 ENCOUNTER — TELEPHONE (OUTPATIENT)
Dept: CARDIOLOGY CLINIC | Age: 72
End: 2022-11-10

## 2022-11-10 NOTE — TELEPHONE ENCOUNTER
----- Message from An Irene Ocasio MD -----  Patient does not have My chart set up from what I can tell. Can you please let him know that the vascular study demonstrated that he had normal flow in the blood vessels in his legs. Overall a normal study. Thanks. Called patient, ID verified using two patient identifiers. Notified patient of above results. Reviewed recent request for Lipitor refill and need to updated Lipid panel. Advised patient that lab slips have been mailed to him and will need to be completed prior to any further refills. Patient verbalized understanding and will call with any other questions.

## 2022-12-20 NOTE — MR AVS SNAPSHOT
Visit Information Date & Time Provider Department Dept. Phone Encounter #  
 1/16/2017  2:30 PM Lalo Higginbotham NP [facility] Sheryl Steel [phone] [id number] Your Appointments 1/16/2017  2:30 PM  
ESTABLISHED PATIENT with Lalo Higginbotham NP  
[facility] ([address]) Appt Note: 2 wk fu appt sll; 2 wk fu appt sll pt rs'd 1/12 to 1/16 kmr  
 [address] [place] [address] [place] [phone]  
  
   
 [address] [facility] [address]  
  
    
 8/4/2017  9:00 AM  
ESTABLISHED PATIENT with Monda Essex, MD  
[facility] ([address] [place] [address] Road) Appt Note: annual visit [address] [facility] [place] [address] [facility]  
[address] [facility] [place] [address] [address] Upcoming Health Maintenance Date Due Hepatitis C Screening 1950 DTaP/Tdap/Td series (1 - Tdap) 9/1/1973 FOBT Q 1 YEAR AGE 50-75 9/1/2002 ZOSTER VACCINE AGE 60> 9/1/2012 INFLUENZA AGE 9 TO ADULT 8/1/2016 Allergies as of 1/16/2017  Review Complete On: 1/16/2017 By: Lalo Higginbotham NP No Known Allergies Current Immunizations  Never Reviewed No immunizations on file. Not reviewed this visit Vitals BP Pulse Resp Height(growth percentile) Weight(growth percentile) SpO2  
 148/86 (BP 1 Location: Right arm, BP Patient Position: Sitting) 78 20 6' 1\" (1.854 m) 224 lb (101.6 kg) 97% BMI Smoking Status 29.55 kg/m2 Never Smoker Vitals History BMI and BSA Data Body Mass Index Body Surface Area  
 29.55 kg/m 2 2.29 m 2 Preferred Pharmacy Pharmacy Name Phone Nohemi [facility] [phone] Your Updated Medication List  
  
   
This list is accurate as of: 1/16/17  2:25 PM.  Always use your most recent med list.  
 amLODIPine 5 mg tablet Commonly known as:  Keke Spruce One pill by mouth at bedtime. atorvastatin 20 mg tablet Commonly known as:  LIPITOR Take 1 Tab by mouth daily. cholecalciferol 1,000 unit tablet Commonly known as:  VITAMIN D3 One pill by mouth daily. labetalol 100 mg tablet Commonly known as:  Kathy James Take 3 Tabs by mouth two (2) times a day. Introducing hospitals & HEALTH SERVICES! Ariadna Becerra introduces Muxlim patient portal. Now you can access parts of your medical record, email your doctor's office, and request medication refills online. 1. In your internet browser, go to [email or web address] 2. Click on the First Time User? Click Here link in the Sign In box. You will see the New Member Sign Up page. 3. Enter your Muxlim Access Code exactly as it appears below. You will not need to use this code after youve completed the sign-up process. If you do not sign up before the expiration date, you must request a new code. · Muxlim Access Code: [email or web address] Expires: 3/29/2017  3:21 PM 
 
4. Enter the last four digits of your Social Security Number (xxxx) and Date of Birth (mm/dd/yyyy) as indicated and click Submit. You will be taken to the next sign-up page. 5. Create a Muxlim ID. This will be your Muxlim login ID and cannot be changed, so think of one that is secure and easy to remember. 6. Create a Muxlim password. You can change your password at any time. 7. Enter your Password Reset Question and Answer. This can be used at a later time if you forget your password. 8. Enter your e-mail address. You will receive e-mail notification when new information is available in [address] 9. Click Sign Up. You can now view and download portions of your medical record. 10. Click the Download Summary menu link to download a portable copy of your medical information. If you have questions, please visit the Frequently Asked Questions section of the Fancyt website. Remember, [employer]pps is NOT to be used for urgent needs. For medical emergencies, dial 911. Now available from your iPhone and Android! Please provide this summary of care documentation to your next provider. Your primary care clinician is listed as Cricket Heller. If you have any questions after today's visit, please call [phone]. Nataly Tijerina)  Obstetrics and Gynecology  [address] [place], [place] [address]  Phone: [phone]  Fax: [phone]  Follow Up Time:

## 2023-01-05 ENCOUNTER — TELEPHONE (OUTPATIENT)
Dept: CARDIOLOGY CLINIC | Age: 73
End: 2023-01-05

## 2023-01-05 NOTE — TELEPHONE ENCOUNTER
----- Message from Michael Woods NP sent at 1/5/2023  9:12 AM EST -----  Please call the patient. Labs unremarkable. Thanks! Called patient, ID verified using two patient identifiers. Informed of above results per request of Symone Vann. Patient verbalizes understanding of al information.

## 2023-02-09 DIAGNOSIS — I10 ESSENTIAL HYPERTENSION, BENIGN: ICD-10-CM

## 2023-02-14 RX ORDER — AMLODIPINE BESYLATE 2.5 MG/1
TABLET ORAL
Qty: 90 TABLET | Refills: 3 | Status: SHIPPED | OUTPATIENT
Start: 2023-02-14

## 2023-02-14 NOTE — TELEPHONE ENCOUNTER
Received refill request for amlodipine 2.5 mg po tabs. Refill request authorized.     Future Appointments   Date Time Provider Shikha Mullen   11/1/2023  9:00 AM Ruby Orona MD [facility]

## 2023-05-08 RX ORDER — ATORVASTATIN CALCIUM 20 MG/1
20 TABLET, FILM COATED ORAL
Qty: 90 TABLET | Refills: 2 | Status: SHIPPED | OUTPATIENT
Start: 2023-05-08

## 2023-05-08 NOTE — TELEPHONE ENCOUNTER
Requested Prescriptions     Signed Prescriptions Disp Refills    atorvastatin (LIPITOR) 20 MG tablet 90 tablet 2     Sig: Take 1 tablet by mouth nightly     Authorizing Provider: Corinne Whitman     Ordering User: Matt Marrufo     Refill per verbal order Parker De Jesus.   Last Visit: 10/19/22  Next Visit: 11/1/23

## 2023-05-25 RX ORDER — AMLODIPINE BESYLATE 2.5 MG/1
1 TABLET ORAL DAILY
COMMUNITY
Start: 2023-02-14

## 2023-05-25 RX ORDER — IBUPROFEN 200 MG
200 TABLET ORAL 2 TIMES DAILY PRN
COMMUNITY

## 2023-05-25 RX ORDER — LABETALOL 200 MG/1
200 TABLET, FILM COATED ORAL 2 TIMES DAILY
COMMUNITY
Start: 2022-10-10

## 2023-10-31 NOTE — PROGRESS NOTES
Cardiac Electrophysiology Office Follow-up    NAME: Gilbert Sotelo   :  1950  MRM:  [id number]    Date:  2023         Assessment and Plan:     1. Primary hypertension  -     EKG 12 Lead  2. Sinus bradycardia  -     EKG 12 Lead  3. Orthostatic hypotension  -     EKG 12 Lead  4. Venous insufficiency  -     EKG 12 Lead  5. ELTON (obstructive sleep apnea)  -     EKG 12 Lead  6. Dyslipidemia  -     EKG 12 Lead     Palpitations  Reports intermittent palpitations lasting 3 to 4 minutes. Does have risk factor for A-fib. Noncompliant on sleep apnea machine  - Educated him on obtaining the [employer] watch  - Referral to sleep center for CPAP fitting  - Obtain 2-week event monitor  - Follow-up with NP in 1 year  - Follow-up with me in 18 months    Hypertension  Well-controlled on current regimen  - Continue amlodipine 2.5 mg daily  - Continue labetalol 2 mg twice daily  - BP is well controlled on the current regimen. No change in antihypertensive medications today. Recommended routine exercise and low sodium diet. Left lower extremity discomfort  Patient reports occasional discomfort in his left leg with some mild loss of hair relative to the right. Denies any claudication  - normal bilateral MARY (22)    ELTON  History of ELTON on CPAP. We spoke regarding the associated between ELTON and its role in AFib pathophysiology. I emphasized the importance of ELTON diagnosis and the need CPAP compliance. - poor compliance    Sinus bradycardia  History of syncope likely vascular related. No recurrence. EKG demonstrated normal sinus rhythm in the 70s. ATTENTION:   This medical record was transcribed using an electronic medical records/speech recognition system. Although proofread, it may and can contain electronic, spelling and other errors. Corrections may be executed at a later time. Please feel free to contact us for any clarifications as needed.         Subjective:      Hung Cabello

## 2023-11-01 ENCOUNTER — OFFICE VISIT (OUTPATIENT)
Age: 73
End: 2023-11-01
Payer: MEDICARE

## 2023-11-01 VITALS
HEART RATE: 68 BPM | RESPIRATION RATE: 16 BRPM | HEIGHT: 73 IN | OXYGEN SATURATION: 98 % | BODY MASS INDEX: 28.34 KG/M2 | WEIGHT: 213.8 LBS | DIASTOLIC BLOOD PRESSURE: 82 MMHG | SYSTOLIC BLOOD PRESSURE: 136 MMHG

## 2023-11-01 DIAGNOSIS — E78.5 DYSLIPIDEMIA: ICD-10-CM

## 2023-11-01 DIAGNOSIS — G47.33 OSA (OBSTRUCTIVE SLEEP APNEA): ICD-10-CM

## 2023-11-01 DIAGNOSIS — I95.1 ORTHOSTATIC HYPOTENSION: ICD-10-CM

## 2023-11-01 DIAGNOSIS — I87.2 VENOUS INSUFFICIENCY: ICD-10-CM

## 2023-11-01 DIAGNOSIS — I10 PRIMARY HYPERTENSION: Primary | ICD-10-CM

## 2023-11-01 DIAGNOSIS — R00.1 SINUS BRADYCARDIA: ICD-10-CM

## 2023-11-01 PROCEDURE — G8427 DOCREV CUR MEDS BY ELIG CLIN: HCPCS | Performed by: INTERNAL MEDICINE

## 2023-11-01 PROCEDURE — 3075F SYST BP GE 130 - 139MM HG: CPT | Performed by: INTERNAL MEDICINE

## 2023-11-01 PROCEDURE — 3079F DIAST BP 80-89 MM HG: CPT | Performed by: INTERNAL MEDICINE

## 2023-11-01 PROCEDURE — 99214 OFFICE O/P EST MOD 30 MIN: CPT | Performed by: INTERNAL MEDICINE

## 2023-11-01 PROCEDURE — 3017F COLORECTAL CA SCREEN DOC REV: CPT | Performed by: INTERNAL MEDICINE

## 2023-11-01 PROCEDURE — 1123F ACP DISCUSS/DSCN MKR DOCD: CPT | Performed by: INTERNAL MEDICINE

## 2023-11-01 PROCEDURE — 93005 ELECTROCARDIOGRAM TRACING: CPT | Performed by: INTERNAL MEDICINE

## 2023-11-01 PROCEDURE — G8419 CALC BMI OUT NRM PARAM NOF/U: HCPCS | Performed by: INTERNAL MEDICINE

## 2023-11-01 PROCEDURE — G8484 FLU IMMUNIZE NO ADMIN: HCPCS | Performed by: INTERNAL MEDICINE

## 2023-11-01 PROCEDURE — 93010 ELECTROCARDIOGRAM REPORT: CPT | Performed by: INTERNAL MEDICINE

## 2023-11-01 PROCEDURE — 1036F TOBACCO NON-USER: CPT | Performed by: INTERNAL MEDICINE

## 2023-11-01 RX ORDER — ASCORBIC ACID 1000 MG
TABLET ORAL DAILY
COMMUNITY

## 2023-11-01 RX ORDER — CHLORAL HYDRATE 500 MG
1 CAPSULE ORAL
COMMUNITY

## 2023-11-01 NOTE — PATIENT INSTRUCTIONS
Consider Actual Experience brenda or Apple Watch  Referral to sleep center for CPAP fitting  Obtain 2 week event monitor  FU with NP in 1 year  FU with Dr. Terence Bullard in 18 month

## 2023-11-28 RX ORDER — LABETALOL 200 MG/1
200 TABLET, FILM COATED ORAL 2 TIMES DAILY
Qty: 180 TABLET | Refills: 3 | Status: SHIPPED | OUTPATIENT
Start: 2023-11-28

## 2023-11-28 NOTE — TELEPHONE ENCOUNTER
Requested Prescriptions     Signed Prescriptions Disp Refills    labetalol (NORMODYNE) 200 MG tablet 180 tablet 3     Sig: TAKE 1 TABLET TWICE A DAY     Authorizing Provider: Thanh Doe     Ordering User: Cynthia Tena     Refill per verbal order Davide Villalobos.   Last Visit: 11/1/23  Next Visit: 11/4/24

## 2023-12-29 NOTE — TELEPHONE ENCOUNTER
Requested Prescriptions     Signed Prescriptions Disp Refills    labetaloL (NORMODYNE) 200 mg tablet 180 Tablet 3     Sig: Take 1 Tablet by mouth two (2) times a day.      Authorizing Provider: Zaria Ordonez     Ordering User: Dick Cruz     Refills per verbal order from Ricardo Garnett NP  Last visit: 9/27/21 Fadumo Santacruz NP)  Next visit: 10/19/22 (Dr. Jett Ware)
Rik Carrillo(Attending)

## 2024-01-31 RX ORDER — ATORVASTATIN CALCIUM 20 MG/1
20 TABLET, FILM COATED ORAL NIGHTLY
Qty: 90 TABLET | Refills: 3 | OUTPATIENT
Start: 2024-01-31

## 2024-01-31 NOTE — TELEPHONE ENCOUNTER
Requested Prescriptions     Refused Prescriptions Disp Refills    atorvastatin (LIPITOR) 20 MG tablet [Pharmacy Med Name: ATORVASTATIN TABS 20MG] 90 tablet 3     Sig: TAKE 1 TABLET NIGHTLY     Refused By: KAI BURDEN     Reason for Refusal: Refill not appropriate     Refill refused per verbal order NGA Valenzuela NP. Refills to be done by patient's PCP.

## 2024-02-09 ENCOUNTER — TELEPHONE (OUTPATIENT)
Age: 74
End: 2024-02-09

## 2024-02-09 DIAGNOSIS — E78.5 HYPERLIPIDEMIA, UNSPECIFIED: ICD-10-CM

## 2024-02-09 DIAGNOSIS — I10 PRIMARY HYPERTENSION: Primary | ICD-10-CM

## 2024-02-09 RX ORDER — AMLODIPINE BESYLATE 2.5 MG/1
2.5 TABLET ORAL DAILY
Qty: 90 TABLET | Refills: 3 | Status: SHIPPED | OUTPATIENT
Start: 2024-02-09

## 2024-02-09 RX ORDER — ATORVASTATIN CALCIUM 20 MG/1
20 TABLET, FILM COATED ORAL
Qty: 90 TABLET | Refills: 2 | Status: SHIPPED | OUTPATIENT
Start: 2024-02-09

## 2024-02-09 NOTE — TELEPHONE ENCOUNTER
Requested Prescriptions     Signed Prescriptions Disp Refills    atorvastatin (LIPITOR) 20 MG tablet 90 tablet 2     Sig: Take 1 tablet by mouth nightly     Authorizing Provider: MIKAYLA MACDONALD     Ordering User: KAI BURDEN     Refill per verbal order Dr. Mikayla Macdonald.   Last visit:11/1/23  Next visit: 11/4/24  Labs needed. Lab slips mailed to patient's home address.

## 2024-02-09 NOTE — TELEPHONE ENCOUNTER
Requested Prescriptions     Signed Prescriptions Disp Refills    amLODIPine (NORVASC) 2.5 MG tablet 90 tablet 3     Sig: TAKE 1 TABLET DAILY     Authorizing Provider: DANIEL MACDONALD     Ordering User: KALLI ALEXANDRA     Refills per verbal order from Dr. Macdonald.  Last OV: 11/1/23  Next OV: 11/4/24

## 2024-02-28 NOTE — TELEPHONE ENCOUNTER
Requested Prescriptions     Signed Prescriptions Disp Refills    amLODIPine (NORVASC) 5 mg tablet 90 Tab 3     Sig: TAKE 1 TABLET NIGHTLY     Authorizing Provider: Satya Bullard     Ordering User: Noel Orr     Refill per verbal order Dr. Bree Herbert. show

## 2024-03-05 LAB
ALBUMIN SERPL-MCNC: 4.5 G/DL (ref 3.8–4.8)
ALBUMIN/GLOB SERPL: 2.5 {RATIO} (ref 1.2–2.2)
ALP SERPL-CCNC: 83 IU/L (ref 44–121)
ALT SERPL-CCNC: 32 IU/L (ref 0–44)
AST SERPL-CCNC: 19 IU/L (ref 0–40)
BILIRUB DIRECT SERPL-MCNC: 0.21 MG/DL (ref 0–0.4)
BILIRUB SERPL-MCNC: 0.8 MG/DL (ref 0–1.2)
BUN SERPL-MCNC: 15 MG/DL (ref 8–27)
BUN/CREAT SERPL: 15 (ref 10–24)
CALCIUM SERPL-MCNC: 9.2 MG/DL (ref 8.6–10.2)
CHLORIDE SERPL-SCNC: 105 MMOL/L (ref 96–106)
CHOLEST SERPL-MCNC: 135 MG/DL (ref 100–199)
CO2 SERPL-SCNC: 24 MMOL/L (ref 20–29)
CREAT SERPL-MCNC: 1.01 MG/DL (ref 0.76–1.27)
EGFRCR SERPLBLD CKD-EPI 2021: 80 ML/MIN/1.73
GLOBULIN SER CALC-MCNC: 1.8 G/DL (ref 1.5–4.5)
GLUCOSE SERPL-MCNC: 103 MG/DL (ref 70–99)
HDLC SERPL-MCNC: 37 MG/DL
IMP & REVIEW OF LAB RESULTS: NORMAL
LDLC SERPL CALC-MCNC: 77 MG/DL (ref 0–99)
POTASSIUM SERPL-SCNC: 4.6 MMOL/L (ref 3.5–5.2)
PROT SERPL-MCNC: 6.3 G/DL (ref 6–8.5)
SODIUM SERPL-SCNC: 141 MMOL/L (ref 134–144)
TRIGL SERPL-MCNC: 112 MG/DL (ref 0–149)
VLDLC SERPL CALC-MCNC: 21 MG/DL (ref 5–40)

## 2024-03-11 ENCOUNTER — TELEPHONE (OUTPATIENT)
Age: 74
End: 2024-03-11

## 2024-03-11 NOTE — TELEPHONE ENCOUNTER
----- Message from Mikayla Macdonald MD sent at 3/10/2024  5:12 PM EDT -----    Please call to let patient know that his monitor looks great without any evidence of atrial fibrillation or abnormal arrhythmias. He triggered the monitor for events with palpitations correlating with normal rhythm rhythm without any rapid or abnormal heart rhythm.  Great results overall.     Called patient, ID verified using two patient identifiers.  Notified of above results.  Patient appreciative of the call and will call back with any other questions or concerns.    Future Appointments   Date Time Provider Department Center   11/4/2024  9:00 AM Loren Valenzuela APRN - NP [facility]   5/2/2025  9:00 AM Mikayla Macdonald MD [facility]

## 2024-05-01 RX ORDER — ATORVASTATIN CALCIUM 20 MG/1
20 TABLET, FILM COATED ORAL DAILY
Qty: 90 TABLET | Refills: 1 | Status: SHIPPED | OUTPATIENT
Start: 2024-05-01

## 2024-10-28 RX ORDER — ATORVASTATIN CALCIUM 20 MG/1
20 TABLET, FILM COATED ORAL DAILY
Qty: 90 TABLET | Refills: 1 | Status: SHIPPED | OUTPATIENT
Start: 2024-10-28

## 2024-11-01 ENCOUNTER — TELEPHONE (OUTPATIENT)
Age: 74
End: 2024-11-01

## 2024-11-01 NOTE — TELEPHONE ENCOUNTER
Called and left message for a return call to have appointment that was on 11/04/2024 rescheduled. Patient can be scheduled with the next available appointment with any of the EP NP.

## 2024-11-01 NOTE — TELEPHONE ENCOUNTER
Spoke with patient and informed patient the previous message was a miscommunication and that his appointment is Monday 11/4/2024, patient confirmed appointment date, time, and location.

## 2024-11-04 ENCOUNTER — OFFICE VISIT (OUTPATIENT)
Age: 74
End: 2024-11-04
Payer: MEDICARE

## 2024-11-04 VITALS
HEART RATE: 77 BPM | DIASTOLIC BLOOD PRESSURE: 72 MMHG | HEIGHT: 73 IN | WEIGHT: 215 LBS | OXYGEN SATURATION: 97 % | BODY MASS INDEX: 28.49 KG/M2 | SYSTOLIC BLOOD PRESSURE: 124 MMHG

## 2024-11-04 DIAGNOSIS — R00.2 PALPITATION: Primary | ICD-10-CM

## 2024-11-04 DIAGNOSIS — I10 PRIMARY HYPERTENSION: ICD-10-CM

## 2024-11-04 PROCEDURE — 99214 OFFICE O/P EST MOD 30 MIN: CPT | Performed by: NURSE PRACTITIONER

## 2024-11-04 PROCEDURE — 93005 ELECTROCARDIOGRAM TRACING: CPT | Performed by: NURSE PRACTITIONER

## 2024-11-04 RX ORDER — FINASTERIDE 5 MG/1
5 TABLET, FILM COATED ORAL DAILY
COMMUNITY
Start: 2024-10-31

## 2024-11-04 RX ORDER — DOXYCYCLINE 100 MG/1
100 CAPSULE ORAL DAILY
COMMUNITY
Start: 2024-10-31

## 2024-11-04 NOTE — PROGRESS NOTES
Chief Complaint   Patient presents with    Other     OLU  ELTON     Vitals:    11/04/24 0844   BP: 124/72   Site: Left Upper Arm   Position: Sitting   Pulse: 77   SpO2: 97%   Weight: 97.5 kg (215 lb)   Height: 1.854 m (6' 1\")     Chest pain: DENIED     Recent hospital stays: DENIED     Refills: DENIED

## 2024-11-04 NOTE — PROGRESS NOTES
Cardiac Electrophysiology Office Follow-up    NAME: Israel Beatty   :  1950  MRM:  [id number]    Date:  2024       Assessment and Plan:     1. Palpitation  -     EKG 12 Lead  2. Primary hypertension       Palpitations  Improved, he believes it was due to stress   - Two week monitor 2024 showed sinus rhythm throughout, no AF. 11 patient triggered events correlated with SR    Hypertension  Well-controlled on current regimen  - Continue amlodipine, labetalol     ELTON  History of mild ELTON  - He declines CPAP    He will establish care with general cardiology and see EP as needed.          Subjective:      Israel Beatty, a 74 y.o. who presents for followup of palpitations, hypertension, dyslipidemia.  Echocardiogram demonstrated preserved LV function without wall motion abnormalities and without valvular regurgitation or stenosis.     He is feeling well and denies chest pain, palpitations, dyspnea, dizziness or syncope.     Review of Systems:   12 point review of systems was performed. All negative except for HPI    Exam:     Physical Exam:  /72 (Site: Left Upper Arm, Position: Sitting)   Pulse 77   Ht 1.854 m (6' 1\")   Wt 97.5 kg (215 lb)   SpO2 97%   BMI 28.37 kg/m²      PHYSICAL EXAM  General:  Alert and oriented, in no acute distress  Head:  Atraumatic, normocephalic  Eyes:  extraocular muscles intact  Neck:  Supple, normal range of motion  Lungs:  Clear to auscultation bilaterally, no wheezes/rales/rhonchi   Cardiovascular:  Regular rate and rhythm, normal S1-S2, no murmurs/rubs/gallops  Abdomen:  Soft, nontender, nondistended, normoactive bowel sounds  Skin:  Intact, no rash  Extremities:, no clubbing, cyanosis, or edema  Musculoskeletal: normal range of motion  Neurological:  Alert and oriented, no focal neurologic deficits  Psychiatric:  Normal mood and affect      Medications:     Current Outpatient Medications   Medication Sig    doxycycline hyclate (VIBRAMYCIN) 100 MG

## 2024-11-22 RX ORDER — LABETALOL 200 MG/1
200 TABLET, FILM COATED ORAL 2 TIMES DAILY
Qty: 180 TABLET | Refills: 3 | Status: SHIPPED | OUTPATIENT
Start: 2024-11-22

## 2024-11-22 NOTE — TELEPHONE ENCOUNTER
Per verbal order from Dr. Libby Guardado  Last appt: Visit date not found     Future Appointments   Date Time Provider Department Center   11/4/2025  9:00 AM Libby Guardado MD [facility]       Requested Prescriptions     Signed Prescriptions Disp Refills    labetalol (NORMODYNE) 200 MG tablet 180 tablet 3     Sig: TAKE 1 TABLET TWICE A DAY     Authorizing Provider: IZAIAH PRUITT     Ordering User: KALLI IVY     Refills to Dr. Guardado

## 2025-03-26 RX ORDER — AMLODIPINE BESYLATE 2.5 MG/1
2.5 TABLET ORAL DAILY
Qty: 90 TABLET | Refills: 2 | Status: SHIPPED | OUTPATIENT
Start: 2025-03-26

## 2025-05-22 RX ORDER — ATORVASTATIN CALCIUM 20 MG/1
20 TABLET, FILM COATED ORAL DAILY
Qty: 90 TABLET | Refills: 1 | Status: SHIPPED | OUTPATIENT
Start: 2025-05-22

## 2025-05-22 NOTE — TELEPHONE ENCOUNTER
Ordered Per  Verbal Order.     Last appt: 11/4/2024   Future Appointments   Date Time Provider Department Center   11/4/2025  9:00 AM Libby Guardado MD [facility]       Requested Prescriptions     Pending Prescriptions Disp Refills    atorvastatin (LIPITOR) 20 MG tablet [Pharmacy Med Name: ATORVASTATIN TABS 20MG] 90 tablet 3     Sig: TAKE 1 TABLET DAILY         Prior labs and Blood pressures:  BP Readings from Last 3 Encounters:   11/04/24 124/72   11/01/23 136/82   10/19/22 132/84     Lab Results   Component Value Date/Time     03/04/2024 11:21 AM    K 4.6 03/04/2024 11:21 AM     03/04/2024 11:21 AM    CO2 24 03/04/2024 11:21 AM    BUN 15 03/04/2024 11:21 AM    GFRAA 77 09/04/2019 02:19 PM       Lab Results   Component Value Date/Time    CHOL 135 03/04/2024 11:21 AM    CHOL 169 01/04/2023 02:14 PM    HDL 37 03/04/2024 11:21 AM    LDL 77 03/04/2024 11:21 AM    VLDL 21 03/04/2024 11:21 AM    VLDL 23 09/14/2020 09:46 AM